# Patient Record
Sex: MALE | Race: BLACK OR AFRICAN AMERICAN | Employment: OTHER | URBAN - METROPOLITAN AREA
[De-identification: names, ages, dates, MRNs, and addresses within clinical notes are randomized per-mention and may not be internally consistent; named-entity substitution may affect disease eponyms.]

---

## 2017-05-04 ENCOUNTER — OP HISTORICAL/CONVERTED ENCOUNTER (OUTPATIENT)
Dept: OTHER | Age: 52
End: 2017-05-04

## 2021-03-19 ENCOUNTER — APPOINTMENT (OUTPATIENT)
Dept: GENERAL RADIOLOGY | Age: 56
End: 2021-03-19
Attending: EMERGENCY MEDICINE
Payer: MEDICARE

## 2021-03-19 ENCOUNTER — HOSPITAL ENCOUNTER (OUTPATIENT)
Age: 56
Setting detail: OBSERVATION
Discharge: HOME HEALTH | End: 2021-03-21
Attending: EMERGENCY MEDICINE | Admitting: INTERNAL MEDICINE
Payer: MEDICARE

## 2021-03-19 ENCOUNTER — APPOINTMENT (OUTPATIENT)
Dept: CT IMAGING | Age: 56
End: 2021-03-19
Attending: EMERGENCY MEDICINE
Payer: MEDICARE

## 2021-03-19 DIAGNOSIS — G45.9 TIA (TRANSIENT ISCHEMIC ATTACK): Primary | ICD-10-CM

## 2021-03-19 PROBLEM — I10 HYPERTENSION, MALIGNANT: Status: ACTIVE | Noted: 2021-03-19

## 2021-03-19 PROBLEM — F31.9 BIPOLAR DEPRESSION (HCC): Status: ACTIVE | Noted: 2021-03-19

## 2021-03-19 PROBLEM — I10 UNCONTROLLED HYPERTENSION: Status: ACTIVE | Noted: 2021-03-19

## 2021-03-19 PROBLEM — I10 MALIGNANT HYPERTENSION: Status: ACTIVE | Noted: 2021-03-19

## 2021-03-19 PROBLEM — F10.10 ALCOHOL ABUSE: Status: ACTIVE | Noted: 2021-03-19

## 2021-03-19 PROBLEM — Z91.199 NON-COMPLIANCE: Status: ACTIVE | Noted: 2021-03-19

## 2021-03-19 PROBLEM — F17.200 SMOKER: Status: ACTIVE | Noted: 2021-03-19

## 2021-03-19 PROBLEM — C61 PROSTATE CANCER (HCC): Chronic | Status: ACTIVE | Noted: 2021-03-19

## 2021-03-19 PROBLEM — C61 PROSTATE CANCER (HCC): Status: ACTIVE | Noted: 2021-03-19

## 2021-03-19 LAB
ALBUMIN SERPL-MCNC: 3.6 G/DL (ref 3.5–5)
ALBUMIN/GLOB SERPL: 0.9 {RATIO} (ref 1.1–2.2)
ALP SERPL-CCNC: 51 U/L (ref 45–117)
ALT SERPL-CCNC: 35 U/L (ref 12–78)
ANION GAP SERPL CALC-SCNC: 7 MMOL/L (ref 5–15)
AST SERPL W P-5'-P-CCNC: 25 U/L (ref 15–37)
BASOPHILS # BLD: 0 K/UL (ref 0–0.1)
BASOPHILS NFR BLD: 1 % (ref 0–1)
BILIRUB SERPL-MCNC: 0.3 MG/DL (ref 0.2–1)
BUN SERPL-MCNC: 16 MG/DL (ref 6–20)
BUN/CREAT SERPL: 14 (ref 12–20)
CA-I BLD-MCNC: 8.8 MG/DL (ref 8.5–10.1)
CHLORIDE SERPL-SCNC: 104 MMOL/L (ref 97–108)
CO2 SERPL-SCNC: 27 MMOL/L (ref 21–32)
CREAT SERPL-MCNC: 1.17 MG/DL (ref 0.7–1.3)
DIFFERENTIAL METHOD BLD: ABNORMAL
EOSINOPHIL # BLD: 0.2 K/UL (ref 0–0.4)
EOSINOPHIL NFR BLD: 4 % (ref 0–7)
ERYTHROCYTE [DISTWIDTH] IN BLOOD BY AUTOMATED COUNT: 14.8 % (ref 11.5–14.5)
GLOBULIN SER CALC-MCNC: 4.1 G/DL (ref 2–4)
GLUCOSE BLD STRIP.AUTO-MCNC: 108 MG/DL (ref 65–100)
GLUCOSE SERPL-MCNC: 112 MG/DL (ref 65–100)
HCT VFR BLD AUTO: 43.2 % (ref 36.6–50.3)
HGB BLD-MCNC: 14.5 G/DL (ref 12.1–17)
IMM GRANULOCYTES # BLD AUTO: 0 K/UL (ref 0–0.04)
IMM GRANULOCYTES NFR BLD AUTO: 0 % (ref 0–0.5)
LYMPHOCYTES # BLD: 1.8 K/UL (ref 0.8–3.5)
LYMPHOCYTES NFR BLD: 32 % (ref 12–49)
MCH RBC QN AUTO: 30.7 PG (ref 26–34)
MCHC RBC AUTO-ENTMCNC: 33.6 G/DL (ref 30–36.5)
MCV RBC AUTO: 91.5 FL (ref 80–99)
MONOCYTES # BLD: 0.6 K/UL (ref 0–1)
MONOCYTES NFR BLD: 11 % (ref 5–13)
NEUTS SEG # BLD: 3 K/UL (ref 1.8–8)
NEUTS SEG NFR BLD: 52 % (ref 32–75)
PERFORMED BY, TECHID: ABNORMAL
PLATELET # BLD AUTO: 214 K/UL (ref 150–400)
PMV BLD AUTO: 11 FL (ref 8.9–12.9)
POTASSIUM SERPL-SCNC: 4.1 MMOL/L (ref 3.5–5.1)
PROT SERPL-MCNC: 7.7 G/DL (ref 6.4–8.2)
RBC # BLD AUTO: 4.72 M/UL (ref 4.1–5.7)
SODIUM SERPL-SCNC: 138 MMOL/L (ref 136–145)
TROPONIN I SERPL-MCNC: <0.05 NG/ML
WBC # BLD AUTO: 5.7 K/UL (ref 4.1–11.1)

## 2021-03-19 PROCEDURE — 71045 X-RAY EXAM CHEST 1 VIEW: CPT

## 2021-03-19 PROCEDURE — 84484 ASSAY OF TROPONIN QUANT: CPT

## 2021-03-19 PROCEDURE — 99218 HC RM OBSERVATION: CPT

## 2021-03-19 PROCEDURE — 74011250637 HC RX REV CODE- 250/637: Performed by: EMERGENCY MEDICINE

## 2021-03-19 PROCEDURE — 74011250637 HC RX REV CODE- 250/637: Performed by: INTERNAL MEDICINE

## 2021-03-19 PROCEDURE — 93005 ELECTROCARDIOGRAM TRACING: CPT

## 2021-03-19 PROCEDURE — 74011250636 HC RX REV CODE- 250/636: Performed by: EMERGENCY MEDICINE

## 2021-03-19 PROCEDURE — 96374 THER/PROPH/DIAG INJ IV PUSH: CPT

## 2021-03-19 PROCEDURE — 70498 CT ANGIOGRAPHY NECK: CPT

## 2021-03-19 PROCEDURE — 70450 CT HEAD/BRAIN W/O DYE: CPT

## 2021-03-19 PROCEDURE — 80053 COMPREHEN METABOLIC PANEL: CPT

## 2021-03-19 PROCEDURE — 85025 COMPLETE CBC W/AUTO DIFF WBC: CPT

## 2021-03-19 PROCEDURE — 99285 EMERGENCY DEPT VISIT HI MDM: CPT

## 2021-03-19 PROCEDURE — 74011000636 HC RX REV CODE- 636: Performed by: EMERGENCY MEDICINE

## 2021-03-19 PROCEDURE — 82962 GLUCOSE BLOOD TEST: CPT

## 2021-03-19 PROCEDURE — 36415 COLL VENOUS BLD VENIPUNCTURE: CPT

## 2021-03-19 RX ORDER — LABETALOL HYDROCHLORIDE 5 MG/ML
5 INJECTION, SOLUTION INTRAVENOUS
Status: DISCONTINUED | OUTPATIENT
Start: 2021-03-19 | End: 2021-03-19

## 2021-03-19 RX ORDER — SODIUM CHLORIDE 0.9 % (FLUSH) 0.9 %
5-40 SYRINGE (ML) INJECTION AS NEEDED
Status: DISCONTINUED | OUTPATIENT
Start: 2021-03-19 | End: 2021-03-21 | Stop reason: HOSPADM

## 2021-03-19 RX ORDER — GUAIFENESIN 100 MG/5ML
81 LIQUID (ML) ORAL DAILY
Status: DISCONTINUED | OUTPATIENT
Start: 2021-03-20 | End: 2021-03-21 | Stop reason: HOSPADM

## 2021-03-19 RX ORDER — ASPIRIN 325 MG
325 TABLET ORAL
Status: COMPLETED | OUTPATIENT
Start: 2021-03-19 | End: 2021-03-19

## 2021-03-19 RX ORDER — FACIAL-BODY WIPES
10 EACH TOPICAL DAILY PRN
Status: DISCONTINUED | OUTPATIENT
Start: 2021-03-19 | End: 2021-03-21 | Stop reason: HOSPADM

## 2021-03-19 RX ORDER — LABETALOL HCL 20 MG/4 ML
10 SYRINGE (ML) INTRAVENOUS
Status: DISCONTINUED | OUTPATIENT
Start: 2021-03-19 | End: 2021-03-21 | Stop reason: HOSPADM

## 2021-03-19 RX ORDER — AMLODIPINE BESYLATE 5 MG/1
5 TABLET ORAL DAILY
Status: DISCONTINUED | OUTPATIENT
Start: 2021-03-19 | End: 2021-03-21

## 2021-03-19 RX ORDER — CHLORDIAZEPOXIDE HYDROCHLORIDE 25 MG/1
25 CAPSULE, GELATIN COATED ORAL 3 TIMES DAILY
Status: DISCONTINUED | OUTPATIENT
Start: 2021-03-19 | End: 2021-03-21

## 2021-03-19 RX ORDER — ACETAMINOPHEN 650 MG/1
650 SUPPOSITORY RECTAL
Status: DISCONTINUED | OUTPATIENT
Start: 2021-03-19 | End: 2021-03-21 | Stop reason: HOSPADM

## 2021-03-19 RX ORDER — POLYETHYLENE GLYCOL 3350 17 G/17G
17 POWDER, FOR SOLUTION ORAL DAILY
Status: DISCONTINUED | OUTPATIENT
Start: 2021-03-20 | End: 2021-03-21 | Stop reason: HOSPADM

## 2021-03-19 RX ORDER — IBUPROFEN 200 MG
1 TABLET ORAL DAILY
Status: DISCONTINUED | OUTPATIENT
Start: 2021-03-20 | End: 2021-03-21 | Stop reason: HOSPADM

## 2021-03-19 RX ORDER — ATORVASTATIN CALCIUM 40 MG/1
40 TABLET, FILM COATED ORAL
Status: DISCONTINUED | OUTPATIENT
Start: 2021-03-19 | End: 2021-03-21 | Stop reason: HOSPADM

## 2021-03-19 RX ORDER — HYDRALAZINE HYDROCHLORIDE 20 MG/ML
10 INJECTION INTRAMUSCULAR; INTRAVENOUS ONCE
Status: COMPLETED | OUTPATIENT
Start: 2021-03-19 | End: 2021-03-19

## 2021-03-19 RX ORDER — METOPROLOL SUCCINATE 100 MG/1
TABLET, EXTENDED RELEASE ORAL DAILY
COMMUNITY
End: 2021-03-21

## 2021-03-19 RX ORDER — SODIUM CHLORIDE 0.9 % (FLUSH) 0.9 %
5-40 SYRINGE (ML) INJECTION EVERY 8 HOURS
Status: DISCONTINUED | OUTPATIENT
Start: 2021-03-19 | End: 2021-03-21 | Stop reason: HOSPADM

## 2021-03-19 RX ORDER — LORAZEPAM 2 MG/ML
1 INJECTION INTRAMUSCULAR
Status: DISCONTINUED | OUTPATIENT
Start: 2021-03-19 | End: 2021-03-21 | Stop reason: HOSPADM

## 2021-03-19 RX ORDER — ACETAMINOPHEN 325 MG/1
650 TABLET ORAL
Status: DISCONTINUED | OUTPATIENT
Start: 2021-03-19 | End: 2021-03-21 | Stop reason: HOSPADM

## 2021-03-19 RX ORDER — METOPROLOL TARTRATE 25 MG/1
25 TABLET, FILM COATED ORAL EVERY 12 HOURS
Status: DISCONTINUED | OUTPATIENT
Start: 2021-03-19 | End: 2021-03-21 | Stop reason: HOSPADM

## 2021-03-19 RX ORDER — DOCUSATE SODIUM 100 MG/1
100 CAPSULE, LIQUID FILLED ORAL 2 TIMES DAILY
Status: DISCONTINUED | OUTPATIENT
Start: 2021-03-19 | End: 2021-03-21 | Stop reason: HOSPADM

## 2021-03-19 RX ADMIN — DOCUSATE SODIUM 100 MG: 100 CAPSULE, LIQUID FILLED ORAL at 20:45

## 2021-03-19 RX ADMIN — AMLODIPINE BESYLATE 5 MG: 5 TABLET ORAL at 20:46

## 2021-03-19 RX ADMIN — IOPAMIDOL 100 ML: 755 INJECTION, SOLUTION INTRAVENOUS at 08:17

## 2021-03-19 RX ADMIN — METOPROLOL TARTRATE 25 MG: 25 TABLET, FILM COATED ORAL at 20:46

## 2021-03-19 RX ADMIN — Medication 10 ML: at 21:00

## 2021-03-19 RX ADMIN — ATORVASTATIN CALCIUM 40 MG: 40 TABLET, FILM COATED ORAL at 20:46

## 2021-03-19 RX ADMIN — CHLORDIAZEPOXIDE HYDROCHLORIDE 25 MG: 25 CAPSULE ORAL at 20:46

## 2021-03-19 RX ADMIN — HYDRALAZINE HYDROCHLORIDE 10 MG: 20 INJECTION INTRAMUSCULAR; INTRAVENOUS at 07:45

## 2021-03-19 RX ADMIN — ASPIRIN 325 MG ORAL TABLET 325 MG: 325 PILL ORAL at 07:51

## 2021-03-19 NOTE — ED TRIAGE NOTES
On Monday 1700, left arm and leg went numb and unable to move, fell in floor, 15-20 minutes able to start moving, and limbs tingled, denied headache, chest pain on Monday, slight right facial droop, and left leg weakness

## 2021-03-19 NOTE — PROGRESS NOTES
Primary Nurse Akash Baker RN and Afua Brunson LPN performed a dual skin assessment on this patient No impairment noted  Zack score is 21

## 2021-03-19 NOTE — H&P
History & Physical    Primary Care Provider: None  Source of Information: Patient, spouse, records and ED physician    History of Presenting Illness:   Denis Krabbe is a 63 y.o. male who presents with history of hypertension, noncompliant with medications at least over the last 2 years, daily alcohol abuse, smoker, history of prostate cancer status post robotic surgery in remission he states and follows up with urology but does not follow-up with primary care in fact has not seen primary care in 2 years. He presented to the freestanding emergency department today with complaints of left-sided weakness. Wife is at bedside assisting with history. She states that on Monday which was 4 days ago patient suddenly seemed to become weak on his left side stating that he had difficulty walking and he had a flaccid left upper extremity. No seizure activity. She tried to get and come to the emergency room that night but he would not and she kept trying over the next few days and finally convinced him to come to the hospital today. His symptoms have dissipated for the most part except he still has some left lower extremity weakness which he states is chronic. On presentation to the emergency room his blood pressure was markedly elevated over 042 systolic, it improved some with some IV hydralazine. Contrast CT of the head reported ill-defined approximate 1 cm oblong hypodensity within the right external capsule, favoring looking. Probable subacute to chronic finding otherwise there is preserved gray-white differentiation and no intracranial hemorrhage. Head and neck showed no evidence for aneurysms, focal stenosis or branch occlusions. He was transferred here to [address] [place] [address] for further evaluation and management. Review of Systems:  Review of Systems   Constitutional: Negative for chills, fever and malaise/fatigue. HENT: Negative. Eyes: Negative. Respiratory: Negative. Cardiovascular: Negative. Gastrointestinal: Negative. Genitourinary:        History of robotic prostate surgery secondary to prostate cancer more than 2 years ago when he states that occasionally has incontinence. Musculoskeletal: Negative. Skin: Negative. Neurological: Positive for focal weakness and weakness. Negative for dizziness, tingling, tremors, sensory change, speech change, seizures, loss of consciousness and headaches. Endo/Heme/Allergies: Negative. Psychiatric/Behavioral: Negative. Past Medical History:   Diagnosis Date    Alcohol abuse 3/19/2021    [facility] ([facility] 3/19/2021    Cancer ([facility]     Prostate Cancer    Hypertension     Non-compliance 3/19/2021    Prostate cancer ([facility] 3/19/2021    Smoker 3/19/2021    Stroke [facility])       Past Surgical History:   Procedure Laterality Date    HX PROSTATE SURGERY      3 of robotic prostate surgery secondary to cancer     Prior to Admission medications    Medication Sig Start Date End Date Taking? Authorizing Provider   metoprolol succinate (TOPROL-XL) 100 mg tablet Take  by mouth daily. Other, MD Curt     No Known Allergies   Family History   Problem Relation Age of Onset    Hypertension Mother         SOCIAL HISTORY:  Patient resides: Lives with wife independently  Independently x   Assisted Living    SNF    With family care       Smoking history:   None    Former    Chronic x     Alcohol history:   None    Social    Chronic x     Ambulates:   Independently x   w/cane    w/walker    w/wc    CODE STATUS:  DNR    Full x   Other      Objective:     Physical Exam:     Visit Vitals  BP (!) 158/83 (BP 1 Location: Right upper arm, BP Patient Position: At rest)   Pulse 70   Temp 98.1 °F (36.7 °C)   Resp 20   Ht 5' 4\" (1.626 m)   Wt 74.8 kg (165 lb)   SpO2 100%   BMI 28.32 kg/m²      O2 Device: Room air    General:  Alert, cooperative, no distress, appears stated age.    Head:  Normocephalic, without obvious abnormality, atraumatic. No facial asymmetry   Eyes:  Conjunctivae/corneas clear. PERRL, EOMs intact. Throat: Lips, mucosa, and tongue normal. Teeth and gums normal.   Neck: Supple, symmetrical, trachea midline, no adenopathy, thyroid: no enlargement/tenderness/nodules, no carotid bruit and no JVD. Back:   Symmetric, no curvature. ROM normal. No CVA tenderness. Lungs:   Clear to auscultation bilaterally. Chest wall:  No tenderness or deformity. Heart:  Regular rate and rhythm, S1, S2 normal, no murmur, click, rub or gallop. Abdomen:   Soft, non-tender. Bowel sounds normal. No masses,  No organomegaly. Extremities: Extremities normal, atraumatic, no cyanosis or edema. Pulses: 2+ and symmetric all extremities. Skin: Skin color, texture, turgor normal. No rashes or lesions   Neurologic: CNII-XII intact. No focal sensory deficits. Motor exam upper extremities 5 out of 5, lower extremities there is very subtle weakness on the left lower extremity which incidentally characterizes is chronic and back to his baseline. Left lower extremity 5/5. Speech is fluent and cognition is intact. Data Review:     Recent Days:  Recent Labs     03/19/21  0727   WBC 5.7   HGB 14.5   HCT 43.2        Recent Labs     03/19/21  0727      K 4.1      CO2 27   *   BUN 16   CREA 1.17   CA 8.8   ALB 3.6   TBILI 0.3   ALT 35     No results for input(s): PH, PCO2, PO2, HCO3, FIO2 in the last 72 hours.     24 Hour Results:  Recent Results (from the past 24 hour(s))   GLUCOSE, POC    Collection Time: 03/19/21  7:19 AM   Result Value Ref Range    Glucose (POC) 108 (H) 65 - 100 mg/dL    Performed by Maury Wilson    CBC WITH AUTOMATED DIFF    Collection Time: 03/19/21  7:27 AM   Result Value Ref Range    WBC 5.7 4.1 - 11.1 K/uL    RBC 4.72 4.10 - 5.70 M/uL    HGB 14.5 12.1 - 17.0 g/dL    HCT 43.2 36.6 - 50.3 %    MCV 91.5 80.0 - 99.0 FL    MCH 30.7 26.0 - 34.0 PG    MCHC 33.6 30.0 - 36.5 g/dL    RDW 14.8 (H) 11.5 - 14.5 %    PLATELET 387 271 - 780 K/uL    MPV 11.0 8.9 - 12.9 FL    NEUTROPHILS 52 32 - 75 %    LYMPHOCYTES 32 12 - 49 %    MONOCYTES 11 5 - 13 %    EOSINOPHILS 4 0 - 7 %    BASOPHILS 1 0 - 1 %    IMMATURE GRANULOCYTES 0 0.0 - 0.5 %    ABS. NEUTROPHILS 3.0 1.8 - 8.0 K/UL    ABS. LYMPHOCYTES 1.8 0.8 - 3.5 K/UL    ABS. MONOCYTES 0.6 0.0 - 1.0 K/UL    ABS. EOSINOPHILS 0.2 0.0 - 0.4 K/UL    ABS. BASOPHILS 0.0 0.0 - 0.1 K/UL    ABS. IMM. GRANS. 0.0 0.00 - 0.04 K/UL    DF AUTOMATED     METABOLIC PANEL, COMPREHENSIVE    Collection Time: 03/19/21  7:27 AM   Result Value Ref Range    Sodium 138 136 - 145 mmol/L    Potassium 4.1 3.5 - 5.1 mmol/L    Chloride 104 97 - 108 mmol/L    CO2 27 21 - 32 mmol/L    Anion gap 7 5 - 15 mmol/L    Glucose 112 (H) 65 - 100 mg/dL    BUN 16 6 - 20 mg/dL    Creatinine 1.17 0.70 - 1.30 mg/dL    BUN/Creatinine ratio 14 12 - 20      GFR est AA >60 >60 ml/min/1.73m2    GFR est non-AA >60 >60 ml/min/1.73m2    Calcium 8.8 8.5 - 10.1 mg/dL    Bilirubin, total 0.3 0.2 - 1.0 mg/dL    AST (SGOT) 25 15 - 37 U/L    ALT (SGPT) 35 12 - 78 U/L    Alk. phosphatase 51 45 - 117 U/L    Protein, total 7.7 6.4 - 8.2 g/dL    Albumin 3.6 3.5 - 5.0 g/dL    Globulin 4.1 (H) 2.0 - 4.0 g/dL    A-G Ratio 0.9 (L) 1.1 - 2.2     TROPONIN I    Collection Time: 03/19/21  7:27 AM   Result Value Ref Range    Troponin-I, Qt. <0.05 <0.05 ng/mL         Imaging:     Assessment:     Active Problems:    TIA (transient ischemic attack) (3/19/2021)      Essential hypertension (3/19/2021)      Non-compliance (3/19/2021)      Malignant hypertension (3/19/2021)      Smoker (3/19/2021)      Prostate cancer ([facility] (3/19/2021)      Alcohol abuse (3/19/2021)      Bipolar depression ([facility] (3/19/2021)       68-year-old gentleman with a history of hypertension but has not followed up with his primary care physician in more than 2 years and takes his medication sporadically.   He knows he takes metoprolol not aware of any other medications. He also takes takes medications for bipolar disorder which he states he is compliant with but does not know the medications of these either. It appears that he had a TIA, symptoms on Monday per video shown to me by his wife showed pretty significant left-sided weakness, gait instability and the symptoms have resolved now and it appears. He has some subtle weakness of his left lower extremity which is characterizes is chronic. Malignant hypertension on presentation, 186/98 on bedside evaluation now      -tia, lue weakness/left facial droop, lle and gait instability initially described with onset 3/15/21- appears back to his baseline.  -Malignant hypertension,  -Noncompliant  -alcohol abuse  -smoker  -bipolar depression    Plan:     Observation admission, working up TIA and hypertension. We will get a TTE, carotid ultrasound, MRI of the brain  Place neurology for further recommendations  Aspirin, statin  We will start him on metoprolol and amlodipine for now, titrate and monitor. Smoking cessation encouraged, Kezia offered  CIWA protocol, will start Librium and taper, Ativan as needed, cessation strongly encouraged. Patient does not know any of his home medications other than metoprolol which he takes only sporadically. He does take medications for bipolar depression and those are at home and his wife will call these in and will resume these in the morning hopefully. CODE STATUS: Full  Surrogate: Wife at bedside  Disposition: Observation admission, anticipating home soon with outpatient follow-up. He will need PCP referral on discharge. Will get PT/OT evaluations as well.     Signed By: Marielos Trinh MD     March 19, 2021

## 2021-03-19 NOTE — ED PROVIDER NOTES
EMERGENCY DEPARTMENT HISTORY AND PHYSICAL EXAM      Date: 3/19/2021  Patient Name: Becky Loza    History of Presenting Illness     Chief Complaint   Patient presents with    Extremity Weakness    Chest Pain       History Provided By: Patient    HPI: Becky Loza, 63 y.o. male with a past medical history significant hypertension, stroke and Prostate cancer presents to the ED with cc of left upper extremity weakness of insidious onset, 5 days ago, which is new. His wife had been trying to get him to come to the emergency room for the last 4 days for evaluation, however he had refused. He also had experienced associated transient facial weakness which is since resolved. No other constitutional symptoms at this time. Changes in mental status or headache, blurred vision or double vision. No history suggestive of amaurosis fugax. There are no other complaints, changes, or physical findings at this time. PCP: None    No current facility-administered medications on file prior to encounter. Current Outpatient Medications on File Prior to Encounter   Medication Sig Dispense Refill    metoprolol succinate (TOPROL-XL) 100 mg tablet Take  by mouth daily.          Past History     Past Medical History:  Past Medical History:   Diagnosis Date    Alcohol abuse 3/19/2021    [facility] ([facility] 3/19/2021    Cancer ([facility]     Prostate Cancer    Hypertension     Non-compliance 3/19/2021    Prostate cancer ([facility] 3/19/2021    Smoker 3/19/2021    [facility]        Past Surgical History:  Past Surgical History:   Procedure Laterality Date    HX PROSTATE SURGERY      3 of robotic prostate surgery secondary to cancer       Family History:  Family History   Problem Relation Age of Onset    Hypertension Mother        Social History:  Social History     Tobacco Use    Smoking status: Current Every Day Smoker     Packs/day: 1.00   Substance Use Topics    Alcohol use: Yes     Frequency: Never     Comment: Daily at least 48 ounces of beer    Drug use: Not Currently       Allergies:  No Known Allergies      Review of Systems     Review of Systems   Constitutional: Negative. Negative for chills, fatigue and fever. HENT: Negative. Negative for congestion, ear discharge, sinus pressure and sore throat. Eyes: Negative. Negative for photophobia. Respiratory: Negative. Negative for cough and shortness of breath. Cardiovascular: Negative. Negative for chest pain and palpitations. Gastrointestinal: Negative. Negative for diarrhea, nausea and vomiting. Endocrine: Negative. Genitourinary: Negative. Negative for dysuria and flank pain. Musculoskeletal: Negative. Skin: Negative. Allergic/Immunologic: Negative. Neurological: Positive for weakness. Negative for dizziness, seizures, syncope and headaches. As in HPI   Hematological: Negative. Psychiatric/Behavioral: Negative. All other systems reviewed and are negative. Physical Exam     Physical Exam  Vitals signs and nursing note reviewed. Constitutional:       General: He is not in acute distress. Appearance: He is well-developed. He is not toxic-appearing or diaphoretic. HENT:      Head: Normocephalic and atraumatic. Nose: Nose normal.      Mouth/Throat:      Mouth: Mucous membranes are moist.      Pharynx: Oropharynx is clear. Eyes:      General: No visual field deficit. Extraocular Movements: Extraocular movements intact. Pupils: Pupils are equal, round, and reactive to light. Neck:      Musculoskeletal: Normal range of motion and neck supple. Cardiovascular:      Rate and Rhythm: Normal rate and regular rhythm. Heart sounds: Normal heart sounds. Pulmonary:      Effort: Pulmonary effort is normal. No respiratory distress. Breath sounds: Normal breath sounds. Chest:      Chest wall: No mass or tenderness. Abdominal:      General: Bowel sounds are normal. There is no abdominal bruit. Palpations: Abdomen is soft. There is no hepatomegaly. Tenderness: There is no abdominal tenderness. There is no rebound. Musculoskeletal: Normal range of motion. Right lower leg: He exhibits no tenderness. No edema. Left lower leg: He exhibits no tenderness. No edema. Skin:     General: Skin is warm and dry. Capillary Refill: Capillary refill takes less than 2 seconds. Neurological:      General: No focal deficit present. Mental Status: He is alert and oriented to person, place, and time. Mental status is at baseline. GCS: GCS eye subscore is 4. GCS verbal subscore is 5. GCS motor subscore is 6. Cranial Nerves: No dysarthria. Sensory: No sensory deficit. Motor: No weakness or pronator drift. Gait: Gait is intact. Psychiatric:         Mood and Affect: Mood normal.         Behavior: Behavior normal.         Lab and Diagnostic Study Results     Labs -     Recent Results (from the past 24 hour(s))   GLUCOSE, POC    Collection Time: 03/19/21  7:19 AM   Result Value Ref Range    Glucose (POC) 108 (H) 65 - 100 mg/dL    Performed by Fili Ni    CBC WITH AUTOMATED DIFF    Collection Time: 03/19/21  7:27 AM   Result Value Ref Range    WBC 5.7 4.1 - 11.1 K/uL    RBC 4.72 4.10 - 5.70 M/uL    HGB 14.5 12.1 - 17.0 g/dL    HCT 43.2 36.6 - 50.3 %    MCV 91.5 80.0 - 99.0 FL    MCH 30.7 26.0 - 34.0 PG    MCHC 33.6 30.0 - 36.5 g/dL    RDW 14.8 (H) 11.5 - 14.5 %    PLATELET 905 656 - 253 K/uL    MPV 11.0 8.9 - 12.9 FL    NEUTROPHILS 52 32 - 75 %    LYMPHOCYTES 32 12 - 49 %    MONOCYTES 11 5 - 13 %    EOSINOPHILS 4 0 - 7 %    BASOPHILS 1 0 - 1 %    IMMATURE GRANULOCYTES 0 0.0 - 0.5 %    ABS. NEUTROPHILS 3.0 1.8 - 8.0 K/UL    ABS. LYMPHOCYTES 1.8 0.8 - 3.5 K/UL    ABS. MONOCYTES 0.6 0.0 - 1.0 K/UL    ABS. EOSINOPHILS 0.2 0.0 - 0.4 K/UL    ABS. BASOPHILS 0.0 0.0 - 0.1 K/UL    ABS. IMM.  GRANS. 0.0 0.00 - 0.04 K/UL    DF AUTOMATED     METABOLIC PANEL, COMPREHENSIVE Collection Time: 03/19/21  7:27 AM   Result Value Ref Range    Sodium 138 136 - 145 mmol/L    Potassium 4.1 3.5 - 5.1 mmol/L    Chloride 104 97 - 108 mmol/L    CO2 27 21 - 32 mmol/L    Anion gap 7 5 - 15 mmol/L    Glucose 112 (H) 65 - 100 mg/dL    BUN 16 6 - 20 mg/dL    Creatinine 1.17 0.70 - 1.30 mg/dL    BUN/Creatinine ratio 14 12 - 20      GFR est AA >60 >60 ml/min/1.73m2    GFR est non-AA >60 >60 ml/min/1.73m2    Calcium 8.8 8.5 - 10.1 mg/dL    Bilirubin, total 0.3 0.2 - 1.0 mg/dL    AST (SGOT) 25 15 - 37 U/L    ALT (SGPT) 35 12 - 78 U/L    Alk. phosphatase 51 45 - 117 U/L    Protein, total 7.7 6.4 - 8.2 g/dL    Albumin 3.6 3.5 - 5.0 g/dL    Globulin 4.1 (H) 2.0 - 4.0 g/dL    A-G Ratio 0.9 (L) 1.1 - 2.2     TROPONIN I    Collection Time: 03/19/21  7:27 AM   Result Value Ref Range    Troponin-I, Qt. <0.05 <0.05 ng/mL     No results found for this or any previous visit (from the past 12 hour(s)). Radiologic Studies -     CT Results  (Last 48 hours)               03/19/21 0822  CTA HEAD NECK W CONT Final result    Impression:  1. Normal head CT examination. No evidence for aneurysms, focal stenoses or   branch occlusions. Neck CTA           Technique: 5 mm noncontrasted images were obtained through the entire neck. Then   0.625 mm axial images with nonionic intravenous contrast were obtained. 2-D and   3-D reformats of head and neck CT angiographic data performed. Comparisons: None        Findings: A standard arch is present. No significant stenosis of the origin of   the great vessels evident. Right carotid artery: No stenoses of the right common carotid artery evident. Minimal atherosclerotic plaque is present at the bifurcation. There is no   stenosis of the origin of the right internal carotid artery by NASCET criteria. Left carotid artery: No stenoses of the left common carotid artery evident. Minimal atherosclerotic plaque is present at the bifurcation.  There is no   stenosis of the origin of the left internal carotid artery by NASCET criteria. The vertebral arteries are codominant without focal stenosis. IMPRESSION:    1. No stenosis of the origin of the right internal carotid artery by NASCET   criteria. 2. No stenosis of the origin of the left internal carotid artery by NASCET   criteria. Narrative: All CT scans at this facility are performed using dose reduction optimization   techniques as appropriate to the performed exam, including the following:   Automated exposure control, adjustment of the MA and/or KVP according to the   patient size, and the use of iterative reconstruction technique. Head CTA           Technique: 3 mm noncontrasted axial images were obtained through the entire   calvarium. Then 1.5 mm axial images with nonionic intravenous contrast obtained. 2-D MIP projections and 3-D images of the intracranial circulation performed. 100 cc Isovue-370 administered. Comparison:  Head CT examination 3/19/2021       Findings: A lacunar infarct is again observed in the right basal ganglia. No   hemorrhage or mass effect. No aneurysms, focal stenoses or branch occlusions are   present. The anterior communicating artery is present. The right posterior   communicating artery is present. The left posterior communicating artery is   present. There is no evidence for vertebral basilar insufficiency. 03/19/21 0726  CT HEAD WO CONT Final result    Impression:  Right external capsule lacune as above. No intracranial hemorrhage. Report called to ordering practitioner 7:45 AM 3/19/2021. Narrative:  CT head. Axial images are reviewed along with reformatted sagittal/coronal images.     Dose reduction: All CT scans at this facility are performed using dose reduction   optimization techniques as appropriate to a performed exam including the   following-   automated exposure control, adjustments of mA and/or Kv according to patient   size, or use of iterative reconstructive technique. .       Partial opacification ethmoid air cells. Otherwise, sinuses and mastoid air   cells are normally aerated. Review of intracranial content reveals ill-defined approximate 1 cm oblong   hypodensity within the right external capsule, favor lacune, probable subacute   to chronic finding. Otherwise, there is preserved gray-white differentiation. No   intracranial hemorrhage. CXR Results  (Last 48 hours)               03/19/21 0737  XR CHEST PORT Final result    Narrative:  Chest single view. A single view of the chest is obtained. Lung volumes are within normal limits. The peripheral lungs are clear. Cardiac and mediastinal structures are within   normal limits. There is no pneumothorax or pleural effusion. EKG done shows normal sinus rhythm, no acute ST-T changes. No STEMI    Medical Decision Making     - I am the first provider for this patient. - I reviewed the vital signs, available nursing notes, past medical history, past surgical history, family history and social history. - Initial assessment performed. The patients presenting problems have been discussed, and they are in agreement with the care plan formulated and outlined with them. I have encouraged them to ask questions as they arise throughout their visit. Vital Signs-Reviewed the patient's vital signs. Patient Vitals for the past 12 hrs:   Temp Pulse Resp BP SpO2   03/19/21 2000 98.5 °F (36.9 °C) 69 18 (!) 174/76 99 %   03/19/21 1600 -- 70 -- -- --   03/19/21 1436 98.1 °F (36.7 °C) 60 20 (!) 158/83 100 %   03/19/21 1213 98.5 °F (36.9 °C) 65 20 (!) 173/84 99 %   03/19/21 1101 99.2 °F (37.3 °C) 64 18 (!) 182/86 98 %       Records Reviewed: Nursing Notes      ED Course/Provider Notes (Medical Decision Making):    Uneventful ED course, clinical improvement with therapy, patient will be discharged to followup with PCP as directed    Disposition     Disposition: Condition stable  Admitted to Dr. Villalobos Flight service for further evaluation and treatment, and neurology consult. Admitted  DISCHARGE PLAN:  1. Current Discharge Medication List      CONTINUE these medications which have NOT CHANGED    Details   metoprolol succinate (TOPROL-XL) 100 mg tablet Take  by mouth daily. 2.   Follow-up Information    None       3. Return to ED if worse   4. Current Discharge Medication List            Diagnosis     Clinical Impression:   1. TIA (transient ischemic attack)        Attestations:    Vasiliy Morales MD    Please note that this dictation was completed with [employer], the [employer] voice recognition software. Quite often unanticipated grammatical, syntax, homophones, and other interpretive errors are inadvertently transcribed by the computer software. Please disregard these errors. Please excuse any errors that have escaped final proofreading. Thank you.

## 2021-03-20 ENCOUNTER — APPOINTMENT (OUTPATIENT)
Dept: MRI IMAGING | Age: 56
End: 2021-03-20
Attending: INTERNAL MEDICINE
Payer: MEDICARE

## 2021-03-20 ENCOUNTER — APPOINTMENT (OUTPATIENT)
Dept: NON INVASIVE DIAGNOSTICS | Age: 56
End: 2021-03-20
Attending: INTERNAL MEDICINE
Payer: MEDICARE

## 2021-03-20 LAB
ANION GAP SERPL CALC-SCNC: 4 MMOL/L (ref 5–15)
ATRIAL RATE: 56 BPM
BUN SERPL-MCNC: 14 MG/DL (ref 6–20)
BUN/CREAT SERPL: 14 (ref 12–20)
CA-I BLD-MCNC: 8.9 MG/DL (ref 8.5–10.1)
CALCULATED P AXIS, ECG09: 60 DEGREES
CALCULATED R AXIS, ECG10: -10 DEGREES
CALCULATED T AXIS, ECG11: -33 DEGREES
CHLORIDE SERPL-SCNC: 105 MMOL/L (ref 97–108)
CHOLEST SERPL-MCNC: 190 MG/DL
CO2 SERPL-SCNC: 29 MMOL/L (ref 21–32)
CREAT SERPL-MCNC: 1.03 MG/DL (ref 0.7–1.3)
DIAGNOSIS, 93000: NORMAL
ECHO AV PEAK GRADIENT: 8 MMHG
ECHO LV E' SEPTAL VELOCITY: 6.53 CM/S
ECHO LV EDV A2C: 62.6 CM3
ECHO LV EJECTION FRACTION A2C: 35 %
ECHO LV EJECTION FRACTION BIPLANE: 72.8 % (ref 55–100)
ECHO LV ESV A2C: 17 CM3
ECHO LV INTERNAL DIMENSION DIASTOLIC: 3.97 CM (ref 4.2–5.9)
ECHO LV INTERNAL DIMENSION SYSTOLIC: 2.57 CM
ECHO LV IVSD: 1.37 CM (ref 0.6–1)
ECHO LV MASS 2D: 191 G (ref 88–224)
ECHO LV MASS INDEX 2D: 106 G/M2 (ref 49–115)
ECHO LV POSTERIOR WALL DIASTOLIC: 1.29 CM (ref 0.6–1)
ECHO LVOT PEAK GRADIENT: 4 MMHG
ECHO MV A VELOCITY: 74.5 CM/S
ECHO MV E DECELERATION TIME (DT): 202 MS
ECHO MV E VELOCITY: 73.6 CM/S
ECHO MV E/A RATIO: 0.99
ECHO MV E/E' SEPTAL: 11.27
ECHO PV PEAK INSTANTANEOUS GRADIENT SYSTOLIC: 6 MMHG
ECHO PV REGURGITANT MAX VELOCITY: 101 CM/S
ECHO PV REGURGITANT MAX VELOCITY: 125 CM/S
ECHO PV REGURGITANT MAX VELOCITY: 143 CM/S
ECHO PVEIN A DURATION: 95 MS
ECHO PVEIN A VELOCITY: 29.6 CM/S
ECHO RA AREA 4C: 15.05 CM2
ECHO RV INTERNAL DIMENSION: 3.47 CM
ERYTHROCYTE [DISTWIDTH] IN BLOOD BY AUTOMATED COUNT: 15.2 % (ref 11.5–14.5)
EST. AVERAGE GLUCOSE BLD GHB EST-MCNC: 114 MG/DL
GLUCOSE SERPL-MCNC: 105 MG/DL (ref 65–100)
HBA1C MFR BLD: 5.6 % (ref 4–5.6)
HCT VFR BLD AUTO: 44.8 % (ref 36.6–50.3)
HDLC SERPL-MCNC: 55 MG/DL
HDLC SERPL: 3.5 {RATIO} (ref 0–5)
HGB BLD-MCNC: 14.7 G/DL (ref 12.1–17)
LDLC SERPL CALC-MCNC: 115.2 MG/DL (ref 0–100)
LIPID PROFILE,FLP: ABNORMAL
MCH RBC QN AUTO: 30.4 PG (ref 26–34)
MCHC RBC AUTO-ENTMCNC: 32.8 G/DL (ref 30–36.5)
MCV RBC AUTO: 92.6 FL (ref 80–99)
P-R INTERVAL, ECG05: 150 MS
PLATELET # BLD AUTO: 234 K/UL (ref 150–400)
PMV BLD AUTO: 12.4 FL (ref 8.9–12.9)
POTASSIUM SERPL-SCNC: 3.6 MMOL/L (ref 3.5–5.1)
Q-T INTERVAL, ECG07: 430 MS
QRS DURATION, ECG06: 86 MS
QTC CALCULATION (BEZET), ECG08: 414 MS
RBC # BLD AUTO: 4.84 M/UL (ref 4.1–5.7)
SODIUM SERPL-SCNC: 138 MMOL/L (ref 136–145)
TRIGL SERPL-MCNC: 99 MG/DL (ref ?–150)
TSH SERPL DL<=0.05 MIU/L-ACNC: 2.76 UIU/ML (ref 0.36–3.74)
VENTRICULAR RATE, ECG03: 56 BPM
VLDLC SERPL CALC-MCNC: 19.8 MG/DL
WBC # BLD AUTO: 6.8 K/UL (ref 4.1–11.1)

## 2021-03-20 PROCEDURE — 80061 LIPID PANEL: CPT

## 2021-03-20 PROCEDURE — 74011250636 HC RX REV CODE- 250/636: Performed by: INTERNAL MEDICINE

## 2021-03-20 PROCEDURE — 70551 MRI BRAIN STEM W/O DYE: CPT

## 2021-03-20 PROCEDURE — 97161 PT EVAL LOW COMPLEX 20 MIN: CPT

## 2021-03-20 PROCEDURE — 80048 BASIC METABOLIC PNL TOTAL CA: CPT

## 2021-03-20 PROCEDURE — 97116 GAIT TRAINING THERAPY: CPT

## 2021-03-20 PROCEDURE — 36415 COLL VENOUS BLD VENIPUNCTURE: CPT

## 2021-03-20 PROCEDURE — 99218 HC RM OBSERVATION: CPT

## 2021-03-20 PROCEDURE — 96375 TX/PRO/DX INJ NEW DRUG ADDON: CPT

## 2021-03-20 PROCEDURE — 92610 EVALUATE SWALLOWING FUNCTION: CPT

## 2021-03-20 PROCEDURE — 93005 ELECTROCARDIOGRAM TRACING: CPT

## 2021-03-20 PROCEDURE — 85027 COMPLETE CBC AUTOMATED: CPT

## 2021-03-20 PROCEDURE — 74011250637 HC RX REV CODE- 250/637: Performed by: INTERNAL MEDICINE

## 2021-03-20 PROCEDURE — 84443 ASSAY THYROID STIM HORMONE: CPT

## 2021-03-20 PROCEDURE — 83036 HEMOGLOBIN GLYCOSYLATED A1C: CPT

## 2021-03-20 PROCEDURE — 97162 PT EVAL MOD COMPLEX 30 MIN: CPT

## 2021-03-20 RX ADMIN — LORAZEPAM 1 MG: 2 INJECTION INTRAMUSCULAR; INTRAVENOUS at 22:02

## 2021-03-20 RX ADMIN — Medication 10 ML: at 14:00

## 2021-03-20 RX ADMIN — ASPIRIN 81 MG CHEWABLE TABLET 81 MG: 81 TABLET CHEWABLE at 08:41

## 2021-03-20 RX ADMIN — METOPROLOL TARTRATE 25 MG: 25 TABLET, FILM COATED ORAL at 21:20

## 2021-03-20 RX ADMIN — DOCUSATE SODIUM 100 MG: 100 CAPSULE, LIQUID FILLED ORAL at 21:21

## 2021-03-20 RX ADMIN — CHLORDIAZEPOXIDE HYDROCHLORIDE 25 MG: 25 CAPSULE ORAL at 08:42

## 2021-03-20 RX ADMIN — CHLORDIAZEPOXIDE HYDROCHLORIDE 25 MG: 25 CAPSULE ORAL at 16:46

## 2021-03-20 RX ADMIN — METOPROLOL TARTRATE 25 MG: 25 TABLET, FILM COATED ORAL at 08:42

## 2021-03-20 RX ADMIN — POLYETHYLENE GLYCOL 3350 17 G: 17 POWDER, FOR SOLUTION ORAL at 08:42

## 2021-03-20 RX ADMIN — DOCUSATE SODIUM 100 MG: 100 CAPSULE, LIQUID FILLED ORAL at 08:42

## 2021-03-20 RX ADMIN — ATORVASTATIN CALCIUM 40 MG: 40 TABLET, FILM COATED ORAL at 21:21

## 2021-03-20 RX ADMIN — Medication 10 ML: at 21:22

## 2021-03-20 RX ADMIN — CHLORDIAZEPOXIDE HYDROCHLORIDE 25 MG: 25 CAPSULE ORAL at 21:21

## 2021-03-20 NOTE — PROGRESS NOTES
SPEECH LANGUAGE PATHOLOGY BEDSIDE SWALLOW EVALUATIONS  Patient: Campbell Grier (38 y.o. male)  Date: 3/20/2021  Primary Diagnosis: TIA (transient ischemic attack) [G45.9]  Uncontrolled hypertension [I10]  Non-compliance [Z91.19]  Alcohol abuse [F10.10]        Precautions: Aspiration       ASSESSMENT :  Nsg reports patient tolerating regular diet and thin liquids without difficulty. Patient and wife deny hx dysphagia, GERD. Speech slightly slurred, left droop noted when speaking but appears symmetrical at rest. Minimal impact noted on overall speech intelligibility. Intermittent fillers and word finding deficits noted. However, patient able to provide hx, ask/respond to questions, and largely maintain reciprocal conversation. Patient frequently making jokes with wife and clinician. Based on the objective data described below, the patient's oropharyngeal swallow [facility]. Oral phase appears adequate. Swallow initiation timely. HLE appears WFL to digital palpation. No overt s/sx aspiration/penetration observed. Patient will benefit from skilled intervention to address the above impairments. Patients rehabilitation potential is considered to be Excellent. PLAN :  Recommendations and Planned Interventions:  Patient appears appropriate to continue regular diet and thin liquids, aspiration precautions advised. Small sips, small bites, slow rate of intake, maintain HOB elevation 90 degrees for at least 30 minutes after meals. Speech/lang evaluation if/as indicated. MBS if/as indicated. Patient reports possible d/c this date or 03-. Patient and wife made aware of possibility for outpatient speech/language evaluation and tx if patient is d/c'd home. Frequency/Duration: Patient will be followed by speech-language pathology 2 times a week to address goals. Discharge Recommendations: To Be Determined     SUBJECTIVE:   Patient alert, oriented x4, pleasant demeanor.  Reports wanting to be discharged home. Patient reports speech is not yet back to normal.     OBJECTIVE:     CXR Results  (Last 48 hours)                 03/19/21 0737  XR CHEST PORT Final result    Narrative:  Chest single view. A single view of the chest is obtained. Lung volumes are within normal limits. The peripheral lungs are clear. Cardiac and mediastinal structures are within   normal limits. There is no pneumothorax or pleural effusion. CT Results  (Last 48 hours)                 03/19/21 0822  CTA HEAD NECK W CONT Final result    Impression:  1. Normal head CT examination. No evidence for aneurysms, focal stenoses or   branch occlusions. Neck CTA           Technique: 5 mm noncontrasted images were obtained through the entire neck. Then   0.625 mm axial images with nonionic intravenous contrast were obtained. 2-D and   3-D reformats of head and neck CT angiographic data performed. Comparisons: None        Findings: A standard arch is present. No significant stenosis of the origin of   the great vessels evident. Right carotid artery: No stenoses of the right common carotid artery evident. Minimal atherosclerotic plaque is present at the bifurcation. There is no   stenosis of the origin of the right internal carotid artery by NASCET criteria. Left carotid artery: No stenoses of the left common carotid artery evident. Minimal atherosclerotic plaque is present at the bifurcation. There is no   stenosis of the origin of the left internal carotid artery by NASCET criteria. The vertebral arteries are codominant without focal stenosis. IMPRESSION:    1. No stenosis of the origin of the right internal carotid artery by NASCET   criteria. 2. No stenosis of the origin of the left internal carotid artery by NASCET   criteria. Narrative:   All CT scans at this facility are performed using dose reduction optimization   techniques as appropriate to the performed exam, including the following:   Automated exposure control, adjustment of the MA and/or KVP according to the   patient size, and the use of iterative reconstruction technique. Head CTA           Technique: 3 mm noncontrasted axial images were obtained through the entire   calvarium. Then 1.5 mm axial images with nonionic intravenous contrast obtained. 2-D MIP projections and 3-D images of the intracranial circulation performed. 100 cc Isovue-370 administered. Comparison:  Head CT examination 3/19/2021       Findings: A lacunar infarct is again observed in the right basal ganglia. No   hemorrhage or mass effect. No aneurysms, focal stenoses or branch occlusions are   present. The anterior communicating artery is present. The right posterior   communicating artery is present. The left posterior communicating artery is   present. There is no evidence for vertebral basilar insufficiency. 03/19/21 0726  CT HEAD WO CONT Final result    Impression:  Right external capsule lacune as above. No intracranial hemorrhage. Report called to ordering practitioner 7:45 AM 3/19/2021. Narrative:  CT head. Axial images are reviewed along with reformatted sagittal/coronal images. Dose reduction: All CT scans at this facility are performed using dose reduction   optimization techniques as appropriate to a performed exam including the   following-   automated exposure control, adjustments of mA and/or Kv according to patient   size, or use of iterative reconstructive technique. .       Partial opacification ethmoid air cells. Otherwise, sinuses and mastoid air   cells are normally aerated. Review of intracranial content reveals ill-defined approximate 1 cm oblong   hypodensity within the right external capsule, favor lacune, probable subacute   to chronic finding. Otherwise, there is preserved gray-white differentiation. No   intracranial hemorrhage. Past Medical History:   Diagnosis Date    Alcohol abuse 3/19/2021    Bipolar depression ([facility] 3/19/2021    Cancer ([facility]     Prostate Cancer    Hypertension     Non-compliance 3/19/2021    [facility] 3/19/2021    Smoker 3/19/2021    Stroke [facility])      Past Surgical History:   Procedure Laterality Date    HX PROSTATE SURGERY      3 of robotic prostate surgery secondary to cancer     Prior Level of Function/Home Situation:   Home Situation  Home Environment: Private residence  # Steps to Enter: 3  Rails to Enter: Yes  Hand Rails : Bilateral  Wheelchair Ramp: No  One/Two Story Residence: One story  Support Systems: Spouse/Significant Other/Partner  Current DME Used/Available at Home: None  Diet prior to admission: regular/thin  Current Diet:  regular/thin     Cognitive and Communication Status:  Neurologic State: Alert, Appropriate for age, Eyes open spontaneously  Orientation Level: Oriented X4  Cognition: Appropriate for age attention/concentration, Follows commands, Recognition of people/places, Appropriate decision making           Swallowing Evaluation:   Oral Assessment:  Oral Assessment  Labial: Left droop  Dentition: Other (comment)(some missing teeth)  Oral Hygiene: adequate  Lingual: No impairment  Velum: No impairment  Mandible: No impairment    P.O. Trials:  Patient Position: upright in bed  Vocal quality prior to P.O.: No impairment  Consistency Presented: Ice chips; Thin liquid;Puree; Solid  How Presented: Self-fed/presented;Cup/sip;Spoon;Straw;Successive swallows  Bolus Acceptance: No impairment  Bolus Formation/Control: No impairment  Propulsion: No impairment  Oral Residue: None  Initiation of Swallow: No impairment  Laryngeal Elevation: Functional  Aspiration Signs/Symptoms: None  Pharyngeal Phase Characteristics: No impairment, issues, or problems   Effective Modifications: Small sips and bites  Cues for Modifications: Minimal       Oral Phase Severity: No impairment  Pharyngeal Phase Severity : No impairment      Voice:   Vocal Quality: No impairment                 After treatment:   Patient left in no apparent distress in bed, Call bell within reach, Nursing notified, and Caregiver / family present    COMMUNICATION/EDUCATION:   Patient and patient's wife educated regarding purpose of SLP assessment, POC, diet recs and sw safety precautions. Patient demonstrated Excellent understanding as evidenced by engagement. The patient's plan of care including recommendations, planned interventions, and recommended diet changes were discussed with: Registered nurse. Patient/family have participated as able in goal setting and plan of care. Patient/family agree to work toward stated goals and plan of care. Thank you for this referral.  Armani Whiting M.S. CCC-SLP                Problem: Dysphagia (Adult)  Goal: *Acute Goals and Plan of Care (Insert Text)  Description: Speech Therapy Goals  Initiated 3/20/2021  -Patient will tolerate regular diet with thin liquids without signs/symptoms of aspiration given no cues within 7 day(s). [ ] Not met  [ ]  MET   [ ] Progressing  [ ] Bo Lyle  -Patient will demonstrate understanding of swallow safety precautions and aspiration precautions, diet recs with no cues within 7 day(s).         [ ] Not met  [ ]  MET   [ ] Progressing  [ ] Discontinue     Outcome: Not Met  Goal: Interventions  Outcome: Not Met

## 2021-03-20 NOTE — PROGRESS NOTES
Problem: Mobility Impaired (Adult and Pediatric)  Goal: *Acute Goals and Plan of Care (Insert Text)  Description: Physical Therapy Goals  Initiated 3/20/2021  1. Patient will move from supine to sit and sit to supine  in bed with independence within 7 day(s). 2.  Patient will ambulate with independence for 300 feet with the least restrictive device within 7 day(s). 3.  Patient will ascend/descend 3 stairs with kirill handrail(s) with supervision/set-up within 7 day(s). Outcome: Not Met     PHYSICAL THERAPY EVALUATION  Patient: Jose Rafael Waterman (27 y.o. male)  Date: 3/20/2021  Primary Diagnosis: TIA (transient ischemic attack) [G45.9]  Uncontrolled hypertension [I10]  Non-compliance [Z91.19]  Alcohol abuse [F10.10]        Precautions: fall       ASSESSMENT  Per H&P Jose Rafael Waterman is a 63 y.o. male who presents with history of hypertension, noncompliant with medications at least over the last 2 years, daily alcohol abuse, smoker, hx of CVA with L side weakness, history of prostate cancer status post robotic surgery in remission he states and follows up with urology but does not follow-up with primary care in fact has not seen primary care in 2 years. He presented to the freestanding emergency department today with complaints of left-sided weakness. Monday 3/15/21 patient suddenly seemed to become weak on his left side stating that he had difficulty walking and he had a flaccid left upper extremity. No seizure activity. She tried to get and come to the emergency room that night but he would not and she kept trying over the next few days and finally convinced him to come to the hospital today. His symptoms have dissipated for the most part except he still has some left lower extremity weakness which he states is chronic. Contrast CT with probable subacute to chronic finding at R external capsule otherwise there is preserved gray-white differentiation and no intracranial hemorrhage.  Pt [facility].    Pt received supine in bed agreeable to PT eval/treatment. A&Ox4. Pt states he lives in single level home with wife, 3 SAPNA with kirill rails. Wife works but is off at this time and would be home most of the day to assist pt as needed for the next few weeks. Pt states he works part time but job involves lifting and is not sure he is going back to work. PLOF IND for all mobility and ADLs, does not have AD available. Reports LLE weakness chronic. Continues to have some numbness in the L hand otherwise symptoms seem to be resolving. At evaluation, pt required min A to transfer supine to sit. Diminished sensation noted through L lower leg and foot to light touch. MMT reveal LLE weakness, grossly 3+/5 to 4/5, see below for details. RLE 5/5 WNL. Able to stand independently without AD, good unsupported standing balance. Gait with SBA x150 feet. Notable gait abnormalities including decreased foot clearance, decreased heel strike, L antalgic gait. Pt would benefit from [facility] for community distance ambulation as pt states his LLE gets tired and he is concerned about falls. Pt reports he is close to baseline for mobility/gait. Pt session cut short as transport arrived to take pt to MRI. Recommend HHPT and pt would benefit from [facility]. Current Level of Function Impacting Discharge (mobility/balance): SBA    Patient will benefit from skilled therapy intervention to address the above noted impairments. PLAN :  Recommendations and Planned Interventions: transfer training, gait training, therapeutic exercises, patient and family training/education, and therapeutic activities      Frequency/Duration: Patient will be followed by physical therapy:  5 times a week to address goals.     Recommendation for discharge: (in order for the patient to meet his/her long term goals)  HHPT    This discharge recommendation:  Has not yet been discussed the attending provider and/or case management    IF patient discharges home will need the following DME: straight cane         SUBJECTIVE:   Patient stated am I going to get to go home soon?     OBJECTIVE DATA SUMMARY:   HISTORY:    Past Medical History:   Diagnosis Date    Alcohol abuse 3/19/2021    Bipolar depression ([facility] 3/19/2021    Cancer ([facility]     Prostate Cancer    Hypertension     Non-compliance 3/19/2021    Prostate cancer ([facility] 3/19/2021    Smoker 3/19/2021    Stroke ([facility]      Past Surgical History:   Procedure Laterality Date    HX PROSTATE SURGERY      3 of robotic prostate surgery secondary to cancer       Personal factors and/or comorbidities impacting plan of care:     Home Situation  Home Environment: Private residence  # Steps to Enter: 3  Rails to Enter: Yes  Hand Rails : Bilateral  Wheelchair Ramp: No  One/Two Story Residence: One story  Support Systems: Spouse/Significant Other/Partner  Current DME Used/Available at Home: None    PLOF: Pt IND for ADLS/IADLS, IND with mobility prior to admission. EXAMINATION/PRESENTATION/DECISION MAKING:   Critical Behavior:  Neurologic State: Alert  Orientation Level: Oriented X4  Cognition: Appropriate decision making, Appropriate safety awareness     Hearing:   Auditory  Auditory Impairment: None  Skin:  intact  Edema: WNL  Range Of Motion:  AROM: Within functional limits                       Strength:    Strength: Generally decreased, functional          Right Left   HIP FLEXION 5/5 3+/5   HIP EXTENSION NT    HIP ABDUCTION NT NT   KNEE FLEXION 5/5 4/5   KNEE EXTENSION 5/5 4-/5   ANKLE PF  5/5 4-/5   ANKLE DF 5/5 4-/5                 Tone & Sensation:                  Sensation: Impaired diminished to LT L lower leg and foot               Coordination:     Vision:      Functional Mobility:  Bed Mobility:     Supine to Sit: Minimum assistance        Transfers:  Sit to Stand: Independent  Stand to Sit: Independent                       Balance:   Sitting: Intact  Standing: Intact  Ambulation/Gait Training:  Distance (ft): 150 Feet (ft)  Assistive Device: Gait belt  Ambulation - Level of Assistance: Stand-by assistance     Gait Description (WDL): Exceptions to WDL  Gait Abnormalities: Decreased step clearance        Base of Support: Widened                              Stairs:      Not tested at evaluation        Therapeutic Exercises:   Unable to provide HEP due to time constraint    Functional Measure:    [address] [place] [id number] AM-PAC [facility] [place]         Basic Mobility Inpatient Short Form  How much difficulty does the patient currently have. .. Unable A Lot A Little None   1. Turning over in bed (including adjusting bedclothes, sheets and blankets)? [] 1   [] 2   [] 3   [x] 4   2. Sitting down on and standing up from a chair with arms ( e.g., wheelchair, bedside commode, etc.)   [] 1   [] 2   [] 3   [x] 4   3. Moving from lying on back to sitting on the side of the bed? [] 1   [] 2   [x] 3   [] 4          How much help from another person does the patient currently need. .. Total A Lot A Little None   4. Moving to and from a bed to a chair (including a wheelchair)? [] 1   [] 2   [x] 3   [] 4   5. Need to walk in hospital room? [] 1   [] 2   [x] 3   [] 4   6. Climbing 3-5 steps with a railing? [] 1   [] 2   [x] 3   [] 4   © 2007, Trustees of [address] [place] [id number], under license to [employer]. All rights reserved     Score:  Initial: 20 Most Recent: CJ (Date: 3/20/21 )   Interpretation of Tool:  Represents activities that are increasingly more difficult (i.e. Bed mobility, Transfers, Gait).   Score 24 23 22-20 19-15 14-10 9-7 6   Modifier CH CI CJ CK CL CM CN          Physical Therapy Evaluation Charge Determination   History Examination Presentation Decision-Making   HIGH Complexity :3+ comorbidities / personal factors will impact the outcome/ POC  MEDIUM Complexity : 3 Standardized tests and measures addressing body structure, function, activity limitation and / or participation in recreation  MEDIUM Complexity : Evolving with changing characteristics  Other Functional Measure [facility] 6 MEDIUM      Based on the above components, the patient evaluation is determined to be of the following complexity level: MEDIUM    Pain Ratin/10    Activity Tolerance:   Good  Please refer to the flowsheet for vital signs taken during this treatment.    After treatment patient left in no apparent distress:   Transport for MRI    COMMUNICATION/EDUCATION:   The patient’s plan of care was discussed with: Registered nurse.     Patient/family have participated as able in goal setting and plan of care.    Thank you for this referral.  Madison Beatty, PT, DPT   Time Calculation: 29 mins

## 2021-03-20 NOTE — PROGRESS NOTES
NEUROLOGY  PROGRESS NOTE    Admission History/Pertinent Events  Sumi Smith is a 63y.o. year old male who presented on 3/19/2021. Patient has a past medical history of Stroke, Prostate Cancer, HTN, Bipolar Disorder, EtOH Abuse, Medication Noncompliance who presented with LHB weakness. Last known normal was 4 days prior to presentation. Patient reportedly has slowly been getting better.     In the ED, emergent CT head concerning for subacute ischemia in the right external capsule. CTA head/neck was without any findings of a large vessel occlusion. Patient's systolic blood pressures were elevated to 220s in the ED. ASSESSMENT/PLAN      Impression  Patient's MRI of the brain revealing right basal gangliar infarct. Patient's TTE without any acute findings. Will continue patient on antiplatelet and statin therapy for stroke prophylaxis and encourage patient to take his blood pressure medications. Of note, patient continues to have systolic blood pressures up to the 170s and will need further management of this. CTH WO  Hypoattenuation in the right putamen/external capsule region consistent with subacute ischemia     CTA Head/Neck  No occlusions, dissections, significant stenosis, pseudoaneurysms or aneurysms in the neurovascular system    MRI Brain WO  Posterior right BG infarct  CMIC    TTE  EF 65%, RA/LA normal, no PFO    TSH: 2.76  HgbA1c: 5.6  LDL: 115.2        Plan      Posterior Right BG Ischemia  -Q6Hr NeuroChecks, TELE  -SBP Goal < 160  -Stroke Prophylaxis: ASA 81, Atorvastatin 40   -PT/OT/ST as needed  -Management of metabolic/infectious derangements to referring teams    Patient follow-up with neurology in 2 weeks from discharge. Please contact neurology with any further questions/concerns. SUBJECTIVE   Patient was some elevated systolic blood pressures up to the 170s. Patient's exam remains unchanged.       Physical/Neurological Exam  General:   Young -American male  Cardiovascular: tachycardic at times  Pulmonary: no increased work of breathing  Gastrointestinal/Abdomen: soft w/hypoactive bowel sounds   Skin: warm and dry      Patient awake, alert; following central and peripheral commands   No expressive or receptive aphasia;  No dysarthria   Pupils react to light bilaterally; EOM Intact   No visual field deficits on gross exam   Intact to light touch on face bilaterally   Left lower facial droop  Motor: 5/5 in the right hemibody, 4/5 in the left hemibody  Sensation to light touch intact grossly throughout but decreased in the left hemibody compared to the right      OBJECTIVE  Vital Signs  Temp:  [97.8 °F (36.6 °C)-100 °F (37.8 °C)]   Pulse (Heart Rate):  [56-71]   BP: (155-240)/()   Resp Rate:  [16-20]   O2 Sat (%):  [98 %-100 %]   Weight:  [74.8 kg (165 lb)]     MEDICATIONS    Current Facility-Administered Medications:     acetaminophen (TYLENOL) tablet 650 mg, 650 mg, Oral, Q4H PRN **OR** acetaminophen (TYLENOL) solution 650 mg, 650 mg, Per NG tube, Q4H PRN **OR** acetaminophen (TYLENOL) suppository 650 mg, 650 mg, Rectal, Q4H PRN, George Roger MD    sodium chloride (NS) flush 5-40 mL, 5-40 mL, IntraVENous, Q8H, JonathanGeorge MD, 10 mL at 03/19/21 2100    sodium chloride (NS) flush 5-40 mL, 5-40 mL, IntraVENous, PRN, George Roger MD    aspirin chewable tablet 81 mg, 81 mg, Oral, DAILY, George Roger MD, 81 mg at 03/20/21 0841    atorvastatin (LIPITOR) tablet 40 mg, 40 mg, Oral, QHS, George Roger MD, 40 mg at 03/19/21 2046    docusate sodium (COLACE) capsule 100 mg, 100 mg, Oral, BID, George Roger MD, 100 mg at 03/20/21 9990    polyethylene glycol (MIRALAX) packet 17 g, 17 g, Oral, DAILY, George Roger MD, 17 g at 03/20/21 9806    bisacodyL (DULCOLAX) suppository 10 mg, 10 mg, Rectal, DAILY PRN, Flor Ji MD    chlordiazePOXIDE (LIBRIUM) capsule 25 mg, 25 mg, Oral, TID, George Roger MD, 25 mg at 03/20/21 0842    LORazepam (ATIVAN) injection 1 mg, 1 mg, IntraVENous, Q4H PRN, George Cortez MD    metoprolol tartrate (LOPRESSOR) tablet 25 mg, 25 mg, Oral, Q12H, George Cortez MD, 25 mg at 03/20/21 0842    amLODIPine (NORVASC) tablet 5 mg, 5 mg, Oral, DAILY, George Cortez MD, 5 mg at 03/19/21 2046    nicotine (NICODERM CQ) 21 mg/24 hr patch 1 Patch, 1 Patch, TransDERmal, DAILY, George Cortez MD, 1 Patch at 03/20/21 0843    labetaloL (NORMODYNE;TRANDATE) 20 mg/4 mL (5 mg/mL) injection 10 mg, 10 mg, IntraVENous, Q10MIN PRN, Dallas Abraham MD      Labs: I've reviewed the labs for today     This document has been prepared by the Dragon voice recognition system, typographical errors may have occurred.  Attempts have been made to correct errors, however inadvertent errors may persist.

## 2021-03-20 NOTE — PROGRESS NOTES
CM met with patient and wife in reference to discharge planning and choice was given for [facility]. Referral sent via [facility].     CM will follow for acceptance and any additional discharge planning needs

## 2021-03-20 NOTE — PROGRESS NOTES
Hospitalist Progress Note               Daily Progress Note: 120/2021  9year-old noncompliant hypertensive smoker alcohol abuse presenting with TIA symptoms started 3/15/2021, now only with residual left leg weakness which he says was present prior. Surmised malignant hypertension freestanding ED, treated with hydralazine, ultimately here started on amlodipine, metoprolol, blood pressure trend much improved. Neuro consult, MRI, TTE pending as well as carotid studies. Subjective: The patient is seen for follow  up. Blood pressure is much better. He is sitting up in bed, no complaints. Left leg weakness persists, he states that he had a stroke 8 years ago and his leg has been weak ever since. Weakness in his left upper extremity and facial droop which occurred 3 to 4 days ago is resolved. MRI is suggestive of a small to moderate zone of acute infarction of the posterior right basal ganglia and adjacent white matter. TTE with preserved LVEF, normal diastolic function. No atrial septal defect present. Mri brain wo:  IMPRESSION  1. Small to moderate zone of acute infarction of the posterior right basal  ganglia and adjacent white matter. 2.  Background of mild chronic small vessel ischemic changes and mild to  moderate diffuse parenchymal volume loss. 2d echo:  · LV: Calculated LVEF is 65%. Normal cavity size, systolic function (ejection fraction normal) and diastolic function. Mild concentric hypertrophy. Wall motion: normal.  · Saline contrast was given to evaluate for intracardiac shunt. · IAS: No atrial septal defect present. Agitated saline contrast study was performed.     Problem List:  Problem List as of 3/20/2021 Never Reviewed          Codes Class Noted - Resolved    TIA (transient ischemic attack) ICD-10-CM: G45.9  ICD-9-CM: 435.9  3/19/2021 - Present        Essential hypertension ICD-10-CM: I10  ICD-9-CM: 401.9  3/19/2021 - Present        Non-compliance ICD-10-CM: Z91.19  ICD-9-CM: V15.81  3/19/2021 - Present        Malignant hypertension ICD-10-CM: I10  ICD-9-CM: 401.0  3/19/2021 - Present        Smoker ICD-10-CM: F17.200  ICD-9-CM: 305.1  3/19/2021 - Present        Prostate cancer ([facility] (Chronic) ICD-10-CM: C61  ICD-9-CM: 185  3/19/2021 - Present        Alcohol abuse ICD-10-CM: F10.10  ICD-9-CM: 305.00  3/19/2021 - Present        Bipolar depression ([facility] ICD-10-CM: F31.9  ICD-9-CM: 296.50  3/19/2021 - Present        Uncontrolled hypertension ICD-10-CM: I10  ICD-9-CM: 401.9  3/19/2021 - Present              Medications reviewed  Current Facility-Administered Medications   Medication Dose Route Frequency    acetaminophen (TYLENOL) tablet 650 mg  650 mg Oral Q4H PRN    Or    acetaminophen (TYLENOL) solution 650 mg  650 mg Per NG tube Q4H PRN    Or    acetaminophen (TYLENOL) suppository 650 mg  650 mg Rectal Q4H PRN    sodium chloride (NS) flush 5-40 mL  5-40 mL IntraVENous Q8H    sodium chloride (NS) flush 5-40 mL  5-40 mL IntraVENous PRN    aspirin chewable tablet 81 mg  81 mg Oral DAILY    atorvastatin (LIPITOR) tablet 40 mg  40 mg Oral QHS    docusate sodium (COLACE) capsule 100 mg  100 mg Oral BID    polyethylene glycol (MIRALAX) packet 17 g  17 g Oral DAILY    bisacodyL (DULCOLAX) suppository 10 mg  10 mg Rectal DAILY PRN    chlordiazePOXIDE (LIBRIUM) capsule 25 mg  25 mg Oral TID    LORazepam (ATIVAN) injection 1 mg  1 mg IntraVENous Q4H PRN    metoprolol tartrate (LOPRESSOR) tablet 25 mg  25 mg Oral Q12H    amLODIPine (NORVASC) tablet 5 mg  5 mg Oral DAILY    nicotine (NICODERM CQ) 21 mg/24 hr patch 1 Patch  1 Patch TransDERmal DAILY    labetaloL (NORMODYNE;TRANDATE) 20 mg/4 mL (5 mg/mL) injection 10 mg  10 mg IntraVENous Q10MIN PRN       Review of Systems:   Constitutional: Negative for chills, fever and malaise/fatigue. HENT: Negative. Eyes: Negative. Respiratory: Negative. Cardiovascular: Negative. Gastrointestinal: Negative.     Genitourinary: History of robotic prostate surgery secondary to prostate cancer more than 2 years ago when he states that occasionally has incontinence. Musculoskeletal: Negative. Skin: Negative. Neurological: Positive for focal weakness and weakness now only left lower extremity which has been chronic. Negative for dizziness, tingling, tremors, sensory change, speech change, seizures, loss of consciousness and headaches. Endo/Heme/Allergies: Negative. Psychiatric/Behavioral: Negative. Objective:   Physical Exam:     Visit Vitals  BP (!) 159/79 (BP 1 Location: Left upper arm, BP Patient Position: At rest)   Pulse 63   Temp 98 °F (36.7 °C)   Resp 18   Ht 5' 4\" (1.626 m)   Wt 74.8 kg (165 lb)   SpO2 100%   BMI 28.32 kg/m²      O2 Device: Room air    Temp (24hrs), Av.4 °F (36.9 °C), Min:97.8 °F (36.6 °C), Max:99.2 °F (37.3 °C)    No intake/output data recorded.  1901 -  0700  In: -   Out: 450 [Urine:450]    General:  Alert, cooperative, no distress, appears stated age. Head:  ncat   Eyes:  Conjunctivae/corneas clear. PERRL, EOMs intact. Throat: MMM   Neck: Supple, symmetrical, trachea midline, no JVD. Lungs:   Clear to auscultation bilaterally. Chest wall:  No tenderness or deformity. Heart:  Regular rate and rhythm, S1, S2 normal, no murmur, click, rub or gallop. Abdomen:   Soft, non-tender. Bowel sounds normal. No masses,  No organomegaly. Extremities: Extremities normal, atraumatic, no cyanosis or edema. Pulses: 2+ and symmetric all extremities. Skin: Skin color, texture, turgor normal. No rashes or lesions   Neurologic: CNII-XII intact. No focal sensory deficits. Motor exam upper extremities 5 out of 5, lower extremities there is very subtle weakness on the left lower extremity which incidentally characterizes is chronic and back to his baseline. Left lower extremity 5/5. Speech is fluent and cognition is intact.        Data Review:       Recent Days:  Recent Labs 03/20/21  0633 03/19/21  0727   WBC 6.8 5.7   HGB 14.7 14.5   HCT 44.8 43.2    214     Recent Labs     03/20/21  0633 03/19/21  0727    138   K 3.6 4.1    104   CO2 29 27   * 112*   BUN 14 16   CREA 1.03 1.17   CA 8.9 8.8   ALB  --  3.6   TBILI  --  0.3   ALT  --  35     No results for input(s): PH, PCO2, PO2, HCO3, FIO2 in the last 72 hours. 24 Hour Results:  Recent Results (from the past 24 hour(s))   CBC W/O DIFF    Collection Time: 03/20/21  6:33 AM   Result Value Ref Range    WBC 6.8 4.1 - 11.1 K/uL    RBC 4.84 4.10 - 5.70 M/uL    HGB 14.7 12.1 - 17.0 g/dL    HCT 44.8 36.6 - 50.3 %    MCV 92.6 80.0 - 99.0 FL    MCH 30.4 26.0 - 34.0 PG    MCHC 32.8 30.0 - 36.5 g/dL    RDW 15.2 (H) 11.5 - 14.5 %    PLATELET 692 683 - 705 K/uL    MPV 12.4 8.9 - 35.3 FL   METABOLIC PANEL, BASIC    Collection Time: 03/20/21  6:33 AM   Result Value Ref Range    Sodium 138 136 - 145 mmol/L    Potassium 3.6 3.5 - 5.1 mmol/L    Chloride 105 97 - 108 mmol/L    CO2 29 21 - 32 mmol/L    Anion gap 4 (L) 5 - 15 mmol/L    Glucose 105 (H) 65 - 100 mg/dL    BUN 14 6 - 20 mg/dL    Creatinine 1.03 0.70 - 1.30 mg/dL    BUN/Creatinine ratio 14 12 - 20      GFR est AA >60 >60 ml/min/1.73m2    GFR est non-AA >60 >60 ml/min/1.73m2    Calcium 8.9 8.5 - 10.1 mg/dL   TSH 3RD GENERATION    Collection Time: 03/20/21  6:33 AM   Result Value Ref Range    TSH 2.76 0.36 - 3.74 uIU/mL           Assessment/     Patient Active Problem List   Diagnosis Code    TIA (transient ischemic attack) G45.9    Essential hypertension I10    Non-compliance Z91.19    Malignant hypertension I10    Smoker F17.200    Prostate cancer ([facility] C61    Alcohol abuse F10.10    [facility] F31.9    Uncontrolled hypertension I10         -Acute CVA, right basal ganglia. Facial droop and left upper extremity weakness are resolved and his left lower extremity weakness is back to baseline.   CTA of the neck did not show any flow-limiting stenosis. CT of the head did not show any significant stenosis. -Malignant hypertension, bp trend better, contributed to above  -Noncompliant  -alcohol abuse, no signs of wd  -smoker  -bipolar depression  -Hyperlipidemia  Plan:  -Continue statin, continue metoprolol 25 twice daily, amlodipine 10 mg, as needed labetalol.  -pt/ot/slp. Recommendations noted. Home health care.  -continue asa  -Neurology appreciated, follow recommendations. -If stable blood pressure, neurology clearance likely discharge in the morning.  -No signs of withdrawal, continue Librium, as needed Ativan, CIWA protocol.  -Encouraged alcohol and tobacco cessation, is motivated and agreeable to NicoDerm on DC see. Remains a full code. Convert to inpatient status in lieu of acute CVA. Wife is at bedside, surrogate. Disposition, as above, pending course, anticipate home in a.m. if neuro clears and blood pressure stable. Total time spent with patient: 30 minutes. This dictation was done by dragon, computer voice recognition software. Often unanticipated grammatical, syntax, phones and other interpretive errors are inadvertently transcribed. Please excuse errors that have escaped final proofreading.     Marta Lobato MD

## 2021-03-20 NOTE — PROGRESS NOTES
Bedside and Verbal shift change report given to Jeanette Jolly RN (oncoming nurse) by Glenna Price RN (offgoing nurse). Report included the following information SBAR and MAR.

## 2021-03-20 NOTE — CONSULTS
Neurology Consult Note    HPI  Mr. Delfina Chapman is a 63 y.o.  male with a history significant for Stroke, Prostate Cancer, HTN, Bipolar Disorder, EtOH Abuse, Medication Noncompliance who presented with LHB weakness. Last known normal was 4 days prior to presentation. Patient reportedly has slowly been getting better. In the ED, emergent CT head concerning for subacute ischemia in the right external capsule. CTA head/neck was without any findings of a large vessel occlusion. Patient's systolic blood pressures were elevated to 220s in the ED. Patient corroborates the above story. Patient reports that he has had a stroke in the past affecting his left lower extremity but nothing that affected his left upper extremity in the past.  He reports he ambulates without cane or walker at home. He has no personal history of seizures that he is aware of. Stroke Workup     CTH WO  Hypoattenuation in the right putamen/external capsule region consistent with subacute ischemia    CTA Head/Neck  No occlusions, dissections, significant stenosis, pseudoaneurysms or aneurysms in the neurovascular system      IMPRESSION  Mr. Delfina Chapman is a 63 y.o.  male with the above history presented with left hemibody weakness. Patient's last known normal was 4 days prior to presentation and patient's symptoms slowly had been getting better to the point where he was nearly back to his baseline. Emergent CT head concerning for right putaminal/external capsule subacute infarct. CTA head/neck was without any findings for large vessel occlusion. Patient also found have systolic blood pressures up to the 220s. Etiology of patient's infarct most likely small vessel disease likely related to patient's underlying untreated hypertension. Will follow up on patient's MRI of the brain.   Will continue patient on antiplatelet and statin therapy per primary team.  And follow-up on rest of stroke workup for further recommendations as necessary. RECOMMENDATIONS      Right Putaminal/External Capsule Ischemia  -Q4Hr NeuroChecks, TELE  -SBP Goal < 160  -Stroke Prophylaxis: ASA 81, Atorvastatin 40   -PT/OT/ST as needed  -Management of metabolic/infectious derangements to referring teams  -F/U MRI Brain WO, TTE, TSH, HgbA1c, Lipid Panel      Diagnosis and plan was discussed extensively with patient who is in agreement with the above plan. They have been counseled regarding potential side effects of the interventions above and would like to proceed with the aforementioned plan. Review of Systems  A 14 point review was done and pertinent positives & negative findings are listed as part of the history of present illness, all other systems were reviewed and are negative. Physical/Neurological Exam  General:   Janeal Ray -American male  Cardiovascular: tachycardic at times  Pulmonary: no increased work of breathing  Gastrointestinal/Abdomen: soft w/hypoactive bowel sounds   Skin: warm and dry      Patient awake, alert; following central and peripheral commands   No expressive or receptive aphasia;  No dysarthria   Pupils react to light bilaterally; EOM Intact   No visual field deficits on gross exam   Intact to light touch on face bilaterally   Left lower facial droop  No gross hearing loss   Tongue is midline   Motor: 5/5 in the right hemibody, 4/5 in the left hemibody  No abnormal movements   Normal tone throughout   Sensation to light touch intact grossly throughout but decreased in the left hemibody compared to the right  Toes equivocal bilaterally    NIHSS: 6      Past Medical History:   Diagnosis Date    Alcohol abuse 3/19/2021    Bipolar depression ([address] 3/19/2021    Cancer ([address]     Prostate Cancer    Hypertension     Non-compliance 3/19/2021    Prostate cancer ([address] 3/19/2021    Smoker 3/19/2021    Stroke ([facility]       Past Surgical History:   Procedure Laterality Date    HX PROSTATE SURGERY      3 of robotic prostate surgery secondary to cancer     No Known Allergies  Family History   Problem Relation Age of Onset    Hypertension Mother      Relationships   Social connections    Talks on phone: Not on file    Gets together: Not on file    Attends Latter-day service: Not on file    Active member of club or organization: Not on file    Attends meetings of clubs or organizations: Not on file    Relationship status: Not on file         Medications  Current Outpatient Medications   Medication Instructions    metoprolol succinate (TOPROL-XL) 100 mg tablet Oral, DAILY       Current Facility-Administered Medications   Medication Dose Route Frequency    acetaminophen (TYLENOL) tablet 650 mg  650 mg Oral Q4H PRN    Or    acetaminophen (TYLENOL) solution 650 mg  650 mg Per NG tube Q4H PRN    Or    acetaminophen (TYLENOL) suppository 650 mg  650 mg Rectal Q4H PRN    sodium chloride (NS) flush 5-40 mL  5-40 mL IntraVENous Q8H    sodium chloride (NS) flush 5-40 mL  5-40 mL IntraVENous PRN    [START ON 3/20/2021] aspirin chewable tablet 81 mg  81 mg Oral DAILY    atorvastatin (LIPITOR) tablet 40 mg  40 mg Oral QHS    docusate sodium (COLACE) capsule 100 mg  100 mg Oral BID    [START ON 3/20/2021] polyethylene glycol (MIRALAX) packet 17 g  17 g Oral DAILY    bisacodyL (DULCOLAX) suppository 10 mg  10 mg Rectal DAILY PRN    chlordiazePOXIDE (LIBRIUM) capsule 25 mg  25 mg Oral TID    LORazepam (ATIVAN) injection 1 mg  1 mg IntraVENous Q4H PRN    metoprolol tartrate (LOPRESSOR) tablet 25 mg  25 mg Oral Q12H    amLODIPine (NORVASC) tablet 5 mg  5 mg Oral DAILY    [START ON 3/20/2021] nicotine (NICODERM CQ) 21 mg/24 hr patch 1 Patch  1 Patch TransDERmal DAILY    labetaloL (NORMODYNE;TRANDATE) injection 10 mg  10 mg IntraVENous Q10MIN PRN        Objective  Temp:  [98.1 °F (36.7 °C)-100 °F (37.8 °C)]   Pulse (Heart Rate):  [56-70]   BP: (158-240)/()   Resp Rate:  [16-20]   O2 Sat (%):  [98 %-100 %]   Weight:  [74.8 kg (165 lb)] Intake/Output Summary (Last 24 hours) at 3/19/2021 2016  Last data filed at 3/19/2021 7468  Gross per 24 hour   Intake --   Output 450 ml   Net -450 ml     Wt Readings from Last 3 Encounters:   03/19/21 74.8 kg (165 lb)        Labs  Recent Results (from the past 24 hour(s))   GLUCOSE, POC    Collection Time: 03/19/21  7:19 AM   Result Value Ref Range    Glucose (POC) 108 (H) 65 - 100 mg/dL    Performed by Tomer Gonzalez    CBC WITH AUTOMATED DIFF    Collection Time: 03/19/21  7:27 AM   Result Value Ref Range    WBC 5.7 4.1 - 11.1 K/uL    RBC 4.72 4.10 - 5.70 M/uL    HGB 14.5 12.1 - 17.0 g/dL    HCT 43.2 36.6 - 50.3 %    MCV 91.5 80.0 - 99.0 FL    MCH 30.7 26.0 - 34.0 PG    MCHC 33.6 30.0 - 36.5 g/dL    RDW 14.8 (H) 11.5 - 14.5 %    PLATELET 940 675 - 037 K/uL    MPV 11.0 8.9 - 12.9 FL    NEUTROPHILS 52 32 - 75 %    LYMPHOCYTES 32 12 - 49 %    MONOCYTES 11 5 - 13 %    EOSINOPHILS 4 0 - 7 %    BASOPHILS 1 0 - 1 %    IMMATURE GRANULOCYTES 0 0.0 - 0.5 %    ABS. NEUTROPHILS 3.0 1.8 - 8.0 K/UL    ABS. LYMPHOCYTES 1.8 0.8 - 3.5 K/UL    ABS. MONOCYTES 0.6 0.0 - 1.0 K/UL    ABS. EOSINOPHILS 0.2 0.0 - 0.4 K/UL    ABS. BASOPHILS 0.0 0.0 - 0.1 K/UL    ABS. IMM. GRANS. 0.0 0.00 - 0.04 K/UL    DF AUTOMATED     METABOLIC PANEL, COMPREHENSIVE    Collection Time: 03/19/21  7:27 AM   Result Value Ref Range    Sodium 138 136 - 145 mmol/L    Potassium 4.1 3.5 - 5.1 mmol/L    Chloride 104 97 - 108 mmol/L    CO2 27 21 - 32 mmol/L    Anion gap 7 5 - 15 mmol/L    Glucose 112 (H) 65 - 100 mg/dL    BUN 16 6 - 20 mg/dL    Creatinine 1.17 0.70 - 1.30 mg/dL    BUN/Creatinine ratio 14 12 - 20      GFR est AA >60 >60 ml/min/1.73m2    GFR est non-AA >60 >60 ml/min/1.73m2    Calcium 8.8 8.5 - 10.1 mg/dL    Bilirubin, total 0.3 0.2 - 1.0 mg/dL    AST (SGOT) 25 15 - 37 U/L    ALT (SGPT) 35 12 - 78 U/L    Alk.  phosphatase 51 45 - 117 U/L    Protein, total 7.7 6.4 - 8.2 g/dL    Albumin 3.6 3.5 - 5.0 g/dL    Globulin 4.1 (H) 2.0 - 4.0 g/dL    A-G Ratio 0.9 (L) 1.1 - 2.2     TROPONIN I    Collection Time: 03/19/21  7:27 AM   Result Value Ref Range    Troponin-I, Qt. <0.05 <0.05 ng/mL          Significant Diagnostic Studies  All images independently visualized    CTA HEAD NECK W CONT   Final Result   1. Normal head CT examination. No evidence for aneurysms, focal stenoses or   branch occlusions. Neck CTA         Technique: 5 mm noncontrasted images were obtained through the entire neck. Then   0.625 mm axial images with nonionic intravenous contrast were obtained. 2-D and   3-D reformats of head and neck CT angiographic data performed. Comparisons: None       Findings: A standard arch is present. No significant stenosis of the origin of   the great vessels evident. Right carotid artery: No stenoses of the right common carotid artery evident. Minimal atherosclerotic plaque is present at the bifurcation. There is no   stenosis of the origin of the right internal carotid artery by NASCET criteria. Left carotid artery: No stenoses of the left common carotid artery evident. Minimal atherosclerotic plaque is present at the bifurcation. There is no   stenosis of the origin of the left internal carotid artery by NASCET criteria. The vertebral arteries are codominant without focal stenosis. IMPRESSION:    1. No stenosis of the origin of the right internal carotid artery by NASCET   criteria. 2. No stenosis of the origin of the left internal carotid artery by NASCET   criteria. XR CHEST PORT   Final Result      CT HEAD WO CONT   Final Result   Right external capsule lacune as above. No intracranial hemorrhage. Report called to ordering practitioner 7:45 AM 3/19/2021. MRI BRAIN WO CONT    (Results Pending)         This document has been prepared by the Dragon voice recognition system, typographical errors may have occurred.  Attempts have been made to correct errors, however inadvertent errors may persist.

## 2021-03-20 NOTE — PROGRESS NOTES
Bedside and Verbal shift change report given to Radha Del Cid RN (oncoming nurse) by Letty Curtis RN (offgoing nurse). Report included the following information SBAR and MAR.

## 2021-03-20 NOTE — PROGRESS NOTES
Attempted to see pt for evaluation. Pt receiving treatment from another service. Will attempt at a later time/date. Thank you.

## 2021-03-21 VITALS
HEART RATE: 66 BPM | RESPIRATION RATE: 19 BRPM | WEIGHT: 165 LBS | TEMPERATURE: 98.3 F | SYSTOLIC BLOOD PRESSURE: 169 MMHG | BODY MASS INDEX: 28.17 KG/M2 | HEIGHT: 64 IN | DIASTOLIC BLOOD PRESSURE: 77 MMHG | OXYGEN SATURATION: 99 %

## 2021-03-21 PROBLEM — I10 UNCONTROLLED HYPERTENSION: Status: RESOLVED | Noted: 2021-03-19 | Resolved: 2021-03-21

## 2021-03-21 PROBLEM — I63.81 CEREBROVASCULAR ACCIDENT (CVA) OF BASAL GANGLIA (HCC): Status: ACTIVE | Noted: 2021-03-21

## 2021-03-21 PROBLEM — I10 MALIGNANT HYPERTENSION: Status: RESOLVED | Noted: 2021-03-19 | Resolved: 2021-03-21

## 2021-03-21 PROBLEM — G45.9 TIA (TRANSIENT ISCHEMIC ATTACK): Status: RESOLVED | Noted: 2021-03-19 | Resolved: 2021-03-21

## 2021-03-21 PROCEDURE — 99218 HC RM OBSERVATION: CPT

## 2021-03-21 PROCEDURE — 97530 THERAPEUTIC ACTIVITIES: CPT

## 2021-03-21 PROCEDURE — 74011250637 HC RX REV CODE- 250/637: Performed by: INTERNAL MEDICINE

## 2021-03-21 PROCEDURE — 97165 OT EVAL LOW COMPLEX 30 MIN: CPT

## 2021-03-21 RX ORDER — IBUPROFEN 200 MG
1 TABLET ORAL DAILY
Qty: 30 PATCH | Refills: 0 | Status: SHIPPED | OUTPATIENT
Start: 2021-03-22 | End: 2021-04-21

## 2021-03-21 RX ORDER — ATORVASTATIN CALCIUM 40 MG/1
40 TABLET, FILM COATED ORAL
Qty: 30 TAB | Refills: 0 | Status: SHIPPED | OUTPATIENT
Start: 2021-03-21 | End: 2021-04-21 | Stop reason: SDUPTHER

## 2021-03-21 RX ORDER — AMLODIPINE BESYLATE 5 MG/1
10 TABLET ORAL DAILY
Status: DISCONTINUED | OUTPATIENT
Start: 2021-03-22 | End: 2021-03-21 | Stop reason: HOSPADM

## 2021-03-21 RX ORDER — GUAIFENESIN 100 MG/5ML
81 LIQUID (ML) ORAL DAILY
Qty: 30 TAB | Refills: 0 | Status: SHIPPED | OUTPATIENT
Start: 2021-03-22 | End: 2021-04-21 | Stop reason: SDUPTHER

## 2021-03-21 RX ORDER — AMLODIPINE BESYLATE 10 MG/1
10 TABLET ORAL DAILY
Qty: 30 TAB | Refills: 0 | Status: SHIPPED | OUTPATIENT
Start: 2021-03-22 | End: 2021-04-21 | Stop reason: SDUPTHER

## 2021-03-21 RX ORDER — ACETAMINOPHEN 325 MG/1
650 TABLET ORAL
Qty: 30 TAB | Refills: 0 | Status: SHIPPED | OUTPATIENT
Start: 2021-03-21

## 2021-03-21 RX ORDER — METOPROLOL SUCCINATE 25 MG/1
25 TABLET, EXTENDED RELEASE ORAL DAILY
Qty: 30 TAB | Refills: 0 | Status: SHIPPED | OUTPATIENT
Start: 2021-03-21 | End: 2021-04-21 | Stop reason: SDUPTHER

## 2021-03-21 RX ADMIN — POLYETHYLENE GLYCOL 3350 17 G: 17 POWDER, FOR SOLUTION ORAL at 09:12

## 2021-03-21 RX ADMIN — METOPROLOL TARTRATE 25 MG: 25 TABLET, FILM COATED ORAL at 09:13

## 2021-03-21 RX ADMIN — DOCUSATE SODIUM 100 MG: 100 CAPSULE, LIQUID FILLED ORAL at 09:12

## 2021-03-21 RX ADMIN — ASPIRIN 81 MG CHEWABLE TABLET 81 MG: 81 TABLET CHEWABLE at 09:12

## 2021-03-21 RX ADMIN — AMLODIPINE BESYLATE 5 MG: 5 TABLET ORAL at 09:13

## 2021-03-21 RX ADMIN — CHLORDIAZEPOXIDE HYDROCHLORIDE 25 MG: 25 CAPSULE ORAL at 09:13

## 2021-03-21 RX ADMIN — Medication 10 ML: at 05:35

## 2021-03-21 NOTE — DISCHARGE SUMMARY
HOSPITALIST DISCHARGE SUMMARY    NAME: Shiv Corcoran   :  1965   MRN:  [id number]     Date/Time:  3/21/2021 10:58 AM    DISCHARGE DIAGNOSIS:  Principal Problem:    Cerebrovascular accident (CVA) of basal ganglia ([facility] (3/21/2021)    Active Problems:    Essential hypertension (3/19/2021)      Non-compliance (3/19/2021)      Smoker (3/19/2021)      Prostate cancer ([facility] (3/19/2021)      Alcohol abuse (3/19/2021)      Bipolar depression ([facility] (3/19/2021)        Admission Diagnosis:  TIA, Malignant /uncontrolled Hypertension    CONSULTATIONS: Neurology and Hospitalist    Procedures: see electronic medical records for all procedures/Xrays and details which were not copied into this note but were reviewed prior to creation of Plan. Please follow-up tests/labs that are still pendin. None     DISCHARGE SUMMARY/HOSPITAL COURSE: for full details see H&P, daily progress notes, labs, consult notes. Briefly As Per HPI:    50-year-old noncompliant hypertensive smoker alcohol abuse presenting with TIA symptoms started 3/15/2021, now only with residual left leg weakness which he says was present prior. Surmised malignant hypertension freestanding ED, treated with hydralazine, ultimately here started on amlodipine, metoprolol, blood pressure trend much improved. CTA of the head and neck without occlusions dissection significant stenosis pseudoaneurysms or aneurysms in the neurovascular system. CT of the head without contrast showed some hypoattenuation in the right putamen/external capsule region consistent with subacute ischemia. MRI of the brain concluded posterior right BG infarct and chronic microvascular ischemic changes. Echocardiogram revealed a preserved LVEF of 65%, RA/LA normal, no PFO. Neuro consulted. Recommendations were for stroke prophylaxis ASA 81 mg daily and atorvastatin 40 mg daily. Blood pressure control/SBP goal less than 160 mmHg.   Neurology follow-up 2 weeks from discharge. In regards to his EtOH abuse, no signs of withdrawal during this admission. Librium will be tapered off. Strongly recommended cessation. In regards to tobacco abuse, cessation strongly encouraged, NicoDerm offered and accepted. Patient has had compliance issues now for several years and has not seen primary care physician. Wife states is going to take him to [facility] for follow-up and vows to maintain compliance. She appears very supportive of him. New medications and discharge recommendations:  Per med rec, amlodipine 10 mg daily, metoprolol 25 mg twice daily, aspirin 81 mg daily, Lipitor 40 mg daily, NicoDerm 21 mg patch daily and will follow up with PCP for de-escalation to 14 mg and 7 mg per protocol. Need to establish with PCP to follow-up within the next week or 2. Will need to follow-up with neurology and 2 weeks from discharge. Return to the emergency room if symptoms should worsen. Followed by PT/OT with rec's for straight cane + hhc/pt/ot. _______________________________________________________________________   Patient seen and examined by me on day of discharge. Pertinent findings are:  Vitals:  Visit Vitals  BP (!) 169/77   Pulse 66   Temp 98.3 °F (36.8 °C)   Resp 19   Ht 5' 4\" (1.626 m)   Wt 74.8 kg (165 lb)   SpO2 99%   BMI 28.32 kg/m²     Temp (24hrs), Av.2 °F (36.8 °C), Min:98 °F (36.7 °C), Max:98.3 °F (36.8 °C)  Mri brain wo:    IMPRESSION  1.  Small to moderate zone of acute infarction of the posterior right basal  ganglia and adjacent white matter. 2.  Background of mild chronic small vessel ischemic changes and mild to  moderate diffuse parenchymal volume loss.     2d echo:  · LV: Calculated LVEF is 65%. Normal cavity size, systolic function (ejection fraction normal) and diastolic function. Mild concentric hypertrophy. Wall motion: normal.  · Saline contrast was given to evaluate for intracardiac shunt.   IAS: No atrial septal defect present. Agitated saline contrast study was performed. Last 24hr Input/Output:  No intake or output data in the 24 hours ending 03/21/21 1058     Gen:Alert, cooperative, no distress, appears stated age. HEENT:ncat, perrla, eomi, nek supple no jvd or carotid bruit. Chest:clear, non labored lungs/respirations. Cv:s1s2 RRR, no edema  Abd:Soft, non-tender. Bowel sounds normal. No masses,  No organomegaly. Neuro:No visual field deficits on gross exam,Intact to light touch on face bilaterally, mild Left lower facial droop,Motor: 5/5 in the right hemibody, 4/5 in the left hemibody. Sensation to light touch intact grossly throughout but decreased in the left hemibody compared to the right    _______________________________________________________________________  DISPOSITION:    Home with Family:    Home with HH/PT/OT/RN: x   SNF/LTC:    JOSE:    OTHER:    ________________________________________________________________________  Medications Reviewed:  Current Discharge Medication List      START taking these medications    Details   acetaminophen (TYLENOL) 325 mg tablet Take 2 Tabs by mouth every four (4) hours as needed for Fever (For temp greater than or equal to 38.5 C or 101.3 F (Unless hepatic failure or contrindicated). Give first line for fever. ). Qty: 30 Tab, Refills: 0      amLODIPine (NORVASC) 10 mg tablet Take 1 Tab by mouth daily. Qty: 30 Tab, Refills: 0      aspirin 81 mg chewable tablet Take 1 Tab by mouth daily. Qty: 30 Tab, Refills: 0      atorvastatin (LIPITOR) 40 mg tablet Take 1 Tab by mouth nightly. Qty: 30 Tab, Refills: 0      nicotine (NICODERM CQ) 21 mg/24 hr 1 Patch by TransDERmal route daily for 30 days. Follow with PCP to arrange for de-escalation with 14 mg and 7 mg patches accordingly. Qty: 30 Patch, Refills: 0         CONTINUE these medications which have CHANGED    Details   metoprolol succinate (Toprol XL) 25 mg XL tablet Take 1 Tab by mouth daily.   Qty: 30 Tab, Refills: 0           PMH/SH reviewed - no change compared to H&P  ________________________________________________________________________    Recommended diet:  Cardiac Diet  Recommended activity:Activity as tolerated, straight cane recommended. If you have questions regarding the hospital related prescriptions or hospital related issues please call Mary Limon at . Follow Up:  Dr. Dr Terry Wolf  and  Neurology you are to call and set up an appointment to see them in 2 weeks  PCP: establish in 1-2 weeks.    ______________________________________________________________________  Total time spent in discharge (min):40    ________________________________________________________________________    Care Plan discussed with:    Comments   Patient x    Family  x    RN     Care Manager                    Consultant:  x    ________________________________________________________________________  Attending Physician: Sridevi Carrera MD  ________________________________________________________________________  **Lab Data Reviewed:  Recent Results (from the past 24 hour(s))   ECHO ADULT COMPLETE    Collection Time: 03/20/21  1:24 PM   Result Value Ref Range    Pulmonic Regurgitant End Max Velocity 143.00 cm/s    AoV PG 8.00 mmHg    IVSd 1.37 (A) 0.60 - 1.00 cm    LVIDd 3.97 (A) 4.20 - 5.90 cm    LVIDs 2.57 cm    Pulmonic Regurgitant End Max Velocity 101.00 cm/s    LVOT Peak Gradient 4.00 mmHg    LVPWd 1.29 (A) 0.60 - 1.00 cm    LV E' Septal Velocity 6.53 cm/s    LV ED Vol A2C 62.60 cm3    BP EF 72.8 55.0 - 100.0 %    LV ES Vol A2C 17.00 cm3    E/E' septal 11.27     LV Ejection Fraction MOD 2C 35 %    Mitral Valve E Wave Deceleration Time 202.00 ms    MV A Az 74.50 cm/s    MV E Az 73.60 cm/s    MV E/A 0.99     Pulmonic Regurgitant End Max Velocity 125.00 cm/s    Pulmonic Valve Systolic Peak Instantaneous Gradient 6.00 mmHg    P Vein A Dur 95.00 ms    Pulmonary Vein \"A\" Wave Velocity 29.60 cm/s    RVIDd 3.47 cm    Right Atrial Area 4C 15.05 cm2    LV Mass .0 88.0 - 224.0 g    LV Mass AL Index 106.0 49.0 - 115.0 g/m2

## 2021-03-21 NOTE — PROGRESS NOTES
I have reviewed discharge instructions with the patient and spouse. The patient and spouse verbalized understanding. Discharge medications reviewed with patient and spouse and appropriate educational materials and side effects teaching were provided. Nurse informed patient/wife that 6 medications were sent to pharmacy. Discussed with the patient and all questioned fully answered. He will call me if any problems arise. Discharge plan of care/case management plan validated with provider discharge order. Nurse removed IV and skin enforced with guaze and tape. Nurse removed telemetry box and returned to unit. Nurse removed left arm nicotine patch. Nurse informed the patient/wife that follow up aappointments still need to be scheduled for PCP and Neurologist. VS WNL. No c/o pain, N/V/D or respiratory distress. Patient escorted to front entrance.

## 2021-03-21 NOTE — PROGRESS NOTES
DC order noted. Chart reviewed. Spoke with Attending regarding post hospital care needs. Phoned [facility] to verify [email or web address] [phone]. Spoke with the RN on call who could not confirm same. She will get in touch with Jasmin Knight, Admission coordinator for instructions. CM name and # provided for call back. Pt has been accepted for home care by [facility]. DC order and summary faxed to [facility] via [employer] by CM for DC this date.

## 2021-03-21 NOTE — PROGRESS NOTES
Problem: Self Care Deficits Care Plan (Adult)  Goal: *Acute Goals and Plan of Care (Insert Text)  Description: Pt will be MI  LB dressing EOB level  Pt will be MI  grooming EOB level  Pt will be MI  sit<-> prep for toilet transfer  Pt will be MI  toilet transfer with LRAD  Pt will be MI  toileting/cloth mgmt LRAD  Pt will be MI  grooming standing sink  Pt will be MI bathing sitting/standing sink LRAD  Pt will be MI kirill UE HEP in prep for self care tasks      Outcome: Not Met     OCCUPATIONAL THERAPY EVALUATION  Patient: Denis Krabbe (45 y.o. male)  Date: 3/21/2021  Primary Diagnosis: TIA (transient ischemic attack) [G45.9]  Uncontrolled hypertension [I10]  Non-compliance [Z91.19]  Alcohol abuse [F10.10]        Precautions:       ASSESSMENT  Patient is 64 y/o male came to [facility] with c/o left sided weakness (starting 4 days prior LUE flaccid) and placed under observation 3/19/2021 for CVA (right masal gangliar infarct per MRI), CT showing subacute ischemia in right external capsule. Pt has hx of HTN, noncomp;iance with medication last 2 years, daily alcohol abuse, smoker, prostate cancer s/p robotiv surgery in remission, Pt received semi supine in bed A&Ox4 and agreeable for OT eval/tx. Per pt report, pt lives with spouse and mother-in-law in one story home with 3 steps right rail to enter (awaiting placement of ramp) and is MI for self care (using grab bars in bathroom) and MI/independent for functional transfers/mobility at baseline. Pt stating spouse works for [employer] however will be home with pt 24/7 for a little while.      Pt currently presents with decreased balance (noted grabing walls at times, per PT eval recommend cane), generalized weakness (LUE grossly 4/5, RUE grossly 5/5), decreased activity tolerance, slower finger opposition LUE compared to RUE however able to complete, intact proprioception, light touch sensation impaired (stating LUE lighter compared to RUE), unable to test finger to nose 2/2 painful IV in right elbow and increased need for assist with self care (SBA LB dressing increased time EOB, SBA grooming standing sink, SBA toileting/cloth mgmt simulated) and functional transfers/mobility (MI bed mobility, SBA sit<->stand and toilet transfer with gait belt). Pt educated on home safety and use of shower chair upon return home (pt stating has shower chair in attic if needed) with pt verbalizing understanding. Pt would benefit from skilled OT services while at [facility] in order to increase safety and independence with self care and functional transfers/mobility. Recommend discharge to home with [facility] and family care when medically appropraite. Current Level of Function Impacting Discharge (ADLs/self-care): SBA  Other factors to consider for discharge: time since onset, severity of deficits, good family support at home        PLAN :  Recommendations and Planned Interventions: self care training, functional mobility training, therapeutic exercise, balance training, therapeutic activities, endurance activities, neuromuscular re-education, patient education, and home safety training    Frequency/Duration: Patient will be followed by occupational therapy 5 times a week to address goals. Recommendation for discharge: (in order for the patient to meet his/her long term goals)  HHOT with family care    This discharge recommendation:  Has been made in collaboration with the attending provider and/or case management    IF patient discharges home will need the following DME: cane per PT report       SUBJECTIVE:   Patient stated the PT lady told me I need a cane.     OBJECTIVE DATA SUMMARY:   HISTORY:   Past Medical History:   Diagnosis Date    Alcohol abuse 3/19/2021    Bipolar depression ([facility] 3/19/2021    Cancer ([facility]     Prostate Cancer    Hypertension     Non-compliance 3/19/2021    Prostate cancer ([facility] 3/19/2021    Smoker 3/19/2021    Stroke [facility])      Past Surgical History: Procedure Laterality Date    HX PROSTATE SURGERY      3 of robotic prostate surgery secondary to cancer       Expanded or extensive additional review of patient history:     Home Situation  Home Environment: Private residence  # Steps to Enter: 3  Rails to Enter: Yes  Hand Rails : Right  Wheelchair Ramp: No  One/Two Story Residence: One story  Living Alone: No  Support Systems: Family member(s), Spouse/Significant Other/Partner  Patient Expects to be Discharged to[de-identified] Private residence  Current DME Used/Available at Home: Shower chair  Tub or Shower Type: Tub/Shower combination    PLOF: Pt MI for ADLS/IADLS, independent with mobility prior to admission. Hand dominance: Right    EXAMINATION OF PERFORMANCE DEFICITS:  Cognitive/Behavioral Status:  Neurologic State: Alert  Orientation Level: Oriented X4  Cognition: Appropriate decision making; Appropriate for age attention/concentration; Appropriate safety awareness; Follows commands      Hearing:   Auditory  Auditory Impairment: None    Range of Motion:  AROM: Generally decreased, functional     Strength:  Strength: Generally decreased, functional(LUE 4/5, RUE 5/5)     Coordination:   Fine Motor Skills-Upper: (LUE slower compared to RUE finger opposition)       Tone & Sensation:    Sensation: Impaired(LUE lighter light touch sensation compared to RUE)     Balance:  Sitting: Intact  Standing: Impaired  Standing - Static: Good  Standing - Dynamic : Fair    Functional Mobility and Transfers for ADLs:  Bed Mobility:  Rolling: Modified independent  Supine to Sit: Modified independent  Scooting: Modified independent    Transfers:  Sit to Stand: Stand-by assistance  Stand to Sit: Stand-by assistance  Bed to Chair: Stand-by assistance  Bathroom Mobility: Stand-by assistance  Toilet Transfer : Stand-by assistance  Assistive Device : Gait Belt    ADL Assessment:     Oral Facial Hygiene/Grooming: Stand-by assistance    Lower Body Dressing: Stand-by assistance(increased time)    Toileting: Stand by assistance        ADL Intervention and task modifications:  Feeding  Feeding Assistance: Independent  Container Management: Independent  Cutting Food: Independent  Utensil Management: Independent  Food to Mouth: Independent  Drink to Mouth: Independent    Grooming  Grooming Assistance: Stand-by assistance  Position Performed: Standing  Washing Face: Stand-by assistance  Washing Hands: Stand-by assistance  Brushing Teeth: Stand-by assistance     Lower Body Dressing Assistance  Socks: Stand-by assistance  Leg Crossed Method Used: Yes  Position Performed: Seated edge of bed    Toileting  Toileting Assistance: Stand-by assistance  Bladder Hygiene: Stand-by assistance  Bowel Hygiene: Stand-by assistance  Clothing Management: Stand-by assistance       [address] [place] [id number] AM-PACTM \"6 Clicks\"                                                       Daily Activity Inpatient Short Form  How much help from another person does the patient currently need. .. Total; A Lot A Little None   1. Putting on and taking off regular lower body clothing? []  1 []  2 [x]  3 []  4   2. Bathing (including washing, rinsing, drying)? []  1 []  2 [x]  3 []  4   3. Toileting, which includes using toilet, bedpan or urinal? [] 1 []  2 [x]  3 []  4   4. Putting on and taking off regular upper body clothing? []  1 []  2 []  3 [x]  4   5. Taking care of personal grooming such as brushing teeth? []  1 []  2 [x]  3 []  4   6. Eating meals? []  1 []  2 []  3 [x]  4   © 2007, Trustees of [address] [place] [id number], under license to [employer]. All rights reserved     Score: 20/24     Interpretation of Tool:  Represents clinically-significant functional categories (i.e. Activities of daily living).   Percentage of Impairment CH    0%   CI    1-19% CJ    20-39% CK    40-59% CL    60-79% CM    80-99% CN     100%   AMPAC  Score 6-24 24 23 20-22 15-19 10-14 7-9 6        Occupational Therapy Evaluation Charge Determination   History Examination Decision-Making   LOW Complexity : Brief history review  MEDIUM Complexity : 3-5 performance deficits relating to physical, cognitive , or psychosocial skils that result in activity limitations and / or participation restrictions MEDIUM Complexity : Patient may present with comorbidities that affect occupational performnce. Miniml to moderate modification of tasks or assistance (eg, physical or verbal ) with assesment(s) is necessary to enable patient to complete evaluation       Based on the above components, the patient evaluation is determined to be of the following complexity level: LOW   Pain Rating:  No pain reported    Activity Tolerance:   Good  Please refer to the flowsheet for vital signs taken during this treatment. After treatment patient left in no apparent distress:    Sitting in chair and Call bell within reach    COMMUNICATION/EDUCATION:   The patients plan of care was discussed with: Registered nurse. Home safety education was provided and the patient/caregiver indicated understanding. and Patient/family have participated as able in goal setting and plan of care. This patients plan of care is appropriate for delegation to [facility].     Thank you for this referral.  Brea Post  Time Calculation: 33 mins

## 2021-04-21 ENCOUNTER — HOSPITAL ENCOUNTER (EMERGENCY)
Age: 56
Discharge: HOME | End: 2021-04-21
Attending: EMERGENCY MEDICINE
Payer: MEDICARE

## 2021-04-21 VITALS
BODY MASS INDEX: 28.17 KG/M2 | WEIGHT: 165 LBS | SYSTOLIC BLOOD PRESSURE: 138 MMHG | TEMPERATURE: 98.4 F | DIASTOLIC BLOOD PRESSURE: 80 MMHG | HEART RATE: 84 BPM | HEIGHT: 64 IN | OXYGEN SATURATION: 99 % | RESPIRATION RATE: 18 BRPM

## 2021-04-21 DIAGNOSIS — J30.89 ENVIRONMENTAL AND SEASONAL ALLERGIES: Primary | ICD-10-CM

## 2021-04-21 DIAGNOSIS — Z76.0 MEDICATION REFILL: ICD-10-CM

## 2021-04-21 PROCEDURE — 99282 EMERGENCY DEPT VISIT SF MDM: CPT

## 2021-04-21 RX ORDER — AMLODIPINE BESYLATE 10 MG/1
10 TABLET ORAL DAILY
Qty: 30 TAB | Refills: 0 | Status: SHIPPED | OUTPATIENT
Start: 2021-04-21

## 2021-04-21 RX ORDER — FLUTICASONE PROPIONATE 50 MCG
2 SPRAY, SUSPENSION (ML) NASAL DAILY
Qty: 1 BOTTLE | Refills: 0 | Status: SHIPPED | OUTPATIENT
Start: 2021-04-21

## 2021-04-21 RX ORDER — METOPROLOL SUCCINATE 25 MG/1
25 TABLET, EXTENDED RELEASE ORAL DAILY
Qty: 30 TAB | Refills: 0 | Status: SHIPPED | OUTPATIENT
Start: 2021-04-21

## 2021-04-21 RX ORDER — ATORVASTATIN CALCIUM 40 MG/1
40 TABLET, FILM COATED ORAL
Qty: 30 TAB | Refills: 0 | Status: SHIPPED | OUTPATIENT
Start: 2021-04-21

## 2021-04-21 RX ORDER — GUAIFENESIN 100 MG/5ML
81 LIQUID (ML) ORAL DAILY
Qty: 30 TAB | Refills: 0 | Status: SHIPPED | OUTPATIENT
Start: 2021-04-21

## 2021-04-21 NOTE — DISCHARGE INSTRUCTIONS
Take medicines as prescribed and follow-up with your PCP in 3 to 5 days. Keep your current appointments. Return to emergency room for any new or worsening symptoms. Thank you! Thank you for allowing me to care for you in the emergency department. I sincerely hope that you are satisfied with your visit today. It is my goal to provide you with excellent care. Below you will find a list of your labs and imaging from your visit today. Should you have any questions regarding these results please do not hesitate to call the emergency department. Labs -     No results found for this or any previous visit (from the past 12 hour(s)). Radiologic Studies -   No orders to display     CT Results  (Last 48 hours)      None          CXR Results  (Last 48 hours)      None               If you feel that you have not received excellent quality care or timely care, please ask to speak to the nurse manager. Please choose us in the future for your continued health care needs. ------------------------------------------------------------------------------------------------------------  The exam and treatment you received in the Emergency Department were for an urgent problem and are not intended as complete care. It is important that you follow-up with a doctor, nurse practitioner, or physician assistant to:  (1) confirm your diagnosis,  (2) re-evaluation of changes in your illness and treatment, and  (3) for ongoing care. If your symptoms become worse or you do not improve as expected and you are unable to reach your usual health care provider, you should return to the Emergency Department. We are available 24 hours a day. Please take your discharge instructions with you when you go to your follow-up appointment. If you have any problem arranging a follow-up appointment, contact the Emergency Department immediately.     If a prescription has been provided, please have it filled as soon as possible to prevent a delay in treatment. Read the entire medication instruction sheet provided to you by the pharmacy. If you have any questions or reservations about taking the medication due to side effects or interactions with other medications, please call your primary care physician or contact the ER to speak with the charge nurse. Make an appointment with your family doctor or the physician you were referred to for follow-up of this visit as instructed on your discharge paperwork, as this is a mandatory follow-up. Return to the ER if you are unable to be seen or if you are unable to be seen in a timely manner. If you have any problem arranging the follow-up visit, contact the Emergency Department immediately.

## 2021-04-21 NOTE — ED TRIAGE NOTES
\" I was discharged from the hospital a while back and they gave me 30 days supply of my medicine and I dont have a primary appointment until July 2021, and  I took my last pills this morning.  \"

## 2021-04-21 NOTE — ED PROVIDER NOTES
EMERGENCY DEPARTMENT HISTORY AND PHYSICAL EXAM      Date: 2021  Patient Name: Marilyn Rosa    History of Presenting Illness     Chief Complaint   Patient presents with    Medication Refill       History Provided By: Patient    HPI: Marilyn Rosa, 63 y.o. male with a past medical history significant hypertension, stroke and Prostate cancer, alcohol abuse and bipolar depression presents to the ED with cc of patient here for medication refills. He also complains of seasonal allergies and eye irritation and nasal congestion secondary to pollen. No other constitutional symptoms no other complaints at this time. PCP: None    No current facility-administered medications on file prior to encounter. Current Outpatient Medications on File Prior to Encounter   Medication Sig Dispense Refill    acetaminophen (TYLENOL) 325 mg tablet Take 2 Tabs by mouth every four (4) hours as needed for Fever (For temp greater than or equal to 38.5 C or 101.3 F (Unless hepatic failure or contrindicated). Give first line for fever. ). 30 Tab 0    [] nicotine (NICODERM CQ) 21 mg/24 hr 1 Patch by TransDERmal route daily for 30 days. Follow with PCP to arrange for de-escalation with 14 mg and 7 mg patches accordingly.  30 Patch 0       Past History     Past Medical History:  Past Medical History:   Diagnosis Date    Alcohol abuse 3/19/2021    Bipolar depression ([address] 3/19/2021    Cancer ([address]     Prostate Cancer    Hypertension     Non-compliance 3/19/2021    Prostate cancer ([address] 3/19/2021    Smoker 3/19/2021    St[address] ([facility] [address]        Past Surgical History:  Past Surgical History:   Procedure Laterality Date    HX PROSTATE SURGERY      3 of robotic prostate surgery secondary to cancer       Family History:  Family History   Problem Relation Age of Onset    Hypertension Mother        Social History:  Social History     Tobacco Use    Smoking status: Current Every Day Smoker     Packs/day: 1.00    Smokeless tobacco: Never Used   Substance Use Topics    Alcohol use: Not Currently     Frequency: Never    Drug use: Not Currently       Allergies:  No Known Allergies      Review of Systems     Review of Systems   Constitutional: Negative. Negative for chills, fatigue and fever. HENT: Negative. Negative for congestion, ear discharge, sinus pressure and sore throat. Eyes: Negative. Negative for photophobia. Respiratory: Negative. Negative for cough and shortness of breath. Cardiovascular: Negative. Negative for chest pain and palpitations. Gastrointestinal: Negative. Negative for diarrhea, nausea and vomiting. Endocrine: Negative. Genitourinary: Negative. Negative for dysuria and flank pain. Musculoskeletal: Negative. Skin: Negative. Allergic/Immunologic: Negative. Neurological: Negative. Negative for seizures, syncope and headaches. Hematological: Negative. Psychiatric/Behavioral: Negative. All other systems reviewed and are negative. Physical Exam     Physical Exam  Vitals signs and nursing note reviewed. Constitutional:       General: He is not in acute distress. Appearance: He is well-developed. He is not toxic-appearing or diaphoretic. HENT:      Head: Normocephalic and atraumatic. Nose: Nose normal.      Mouth/Throat:      Mouth: Mucous membranes are moist.      Pharynx: Oropharynx is clear. Eyes:      Extraocular Movements: Extraocular movements intact. Pupils: Pupils are equal, round, and reactive to light. Neck:      Musculoskeletal: Normal range of motion and neck supple. Cardiovascular:      Rate and Rhythm: Normal rate and regular rhythm. Heart sounds: Normal heart sounds. Pulmonary:      Effort: Pulmonary effort is normal. No respiratory distress. Breath sounds: Normal breath sounds. Chest:      Chest wall: No mass or tenderness. Abdominal:      General: Bowel sounds are normal. There is no abdominal bruit.       Palpations: Abdomen is soft. There is no hepatomegaly. Tenderness: There is no abdominal tenderness. There is no rebound. Musculoskeletal: Normal range of motion. Right lower leg: He exhibits no tenderness. No edema. Left lower leg: He exhibits no tenderness. No edema. Skin:     General: Skin is warm and dry. Capillary Refill: Capillary refill takes less than 2 seconds. Neurological:      General: No focal deficit present. Mental Status: He is alert and oriented to person, place, and time. Psychiatric:         Mood and Affect: Mood normal.         Behavior: Behavior normal.         Lab and Diagnostic Study Results     Labs -   No results found for this or any previous visit (from the past 12 hour(s)). Radiologic Studies -     CT Results  (Last 48 hours)    None        CXR Results  (Last 48 hours)    None            Medical Decision Making   - I am the first provider for this patient. - I reviewed the vital signs, available nursing notes, past medical history, past surgical history, family history and social history. - Initial assessment performed. The patients presenting problems have been discussed, and they are in agreement with the care plan formulated and outlined with them. I have encouraged them to ask questions as they arise throughout their visit. Vital Signs-Reviewed the patient's vital signs. No data found. Records Reviewed: Nursing Notes    ED Course/Provider Notes (Medical Decision Making): Uneventful ED course, clinical improvement with therapy, patient will be discharged to followup with PCP as directed    Disposition     Disposition: Condition stable and improved  DC- Adult Discharges: All of the diagnostic tests were reviewed and questions answered. Diagnosis, care plan and treatment options were discussed. The patient understands the instructions and will follow up as directed. The patients results have been reviewed with them.   They have been counseled regarding their diagnosis. The patient verbally convey understanding and agreement of the signs, symptoms, diagnosis, treatment and prognosis and additionally agrees to follow up as recommended with their PCP in 24 - 48 hours. They also agree with the care-plan and convey that all of their questions have been answered. I have also put together some discharge instructions for them that include: 1) educational information regarding their diagnosis, 2) how to care for their diagnosis at home, as well a 3) list of reasons why they would want to return to the ED prior to their follow-up appointment, should their condition change. Discharged    DISCHARGE PLAN:  1. Current Discharge Medication List      CONTINUE these medications which have NOT CHANGED    Details   amLODIPine (NORVASC) 10 mg tablet Take 1 Tab by mouth daily. Qty: 30 Tab, Refills: 0      aspirin 81 mg chewable tablet Take 1 Tab by mouth daily. Qty: 30 Tab, Refills: 0      atorvastatin (LIPITOR) 40 mg tablet Take 1 Tab by mouth nightly. Qty: 30 Tab, Refills: 0      metoprolol succinate (Toprol XL) 25 mg XL tablet Take 1 Tab by mouth daily. Qty: 30 Tab, Refills: 0      acetaminophen (TYLENOL) 325 mg tablet Take 2 Tabs by mouth every four (4) hours as needed for Fever (For temp greater than or equal to 38.5 C or 101.3 F (Unless hepatic failure or contrindicated). Give first line for fever. ). Qty: 30 Tab, Refills: 0      nicotine (NICODERM CQ) 21 mg/24 hr 1 Patch by TransDERmal route daily for 30 days. Follow with PCP to arrange for de-escalation with 14 mg and 7 mg patches accordingly. Qty: 30 Patch, Refills: 0           2. Follow-up Information     Follow up With Specialties Details Why Contact Info    Follow-up with PCP as directed  In 3 days          3. Return to ED if worse   4.    Discharge Medication List as of 4/21/2021  9:35 AM      START taking these medications    Details   fluticasone propionate (FLONASE) 50 mcg/actuation nasal spray 2 Sprays by Both Nostrils route daily. , Normal, Disp-1 Bottle, R-0         CONTINUE these medications which have CHANGED    Details   amLODIPine (NORVASC) 10 mg tablet Take 1 Tab by mouth daily. , Normal, Disp-30 Tab, R-0      aspirin 81 mg chewable tablet Take 1 Tab by mouth daily. , Normal, Disp-30 Tab, R-0      atorvastatin (LIPITOR) 40 mg tablet Take 1 Tab by mouth nightly., Normal, Disp-30 Tab, R-0      metoprolol succinate (Toprol XL) 25 mg XL tablet Take 1 Tab by mouth daily. , Normal, Disp-30 Tab, R-0         CONTINUE these medications which have NOT CHANGED    Details   acetaminophen (TYLENOL) 325 mg tablet Take 2 Tabs by mouth every four (4) hours as needed for Fever (For temp greater than or equal to 38.5 C or 101.3 F (Unless hepatic failure or contrindicated). Give first line for fever. )., Normal, Disp-30 Tab, R-0      nicotine (NICODERM CQ) 21 mg/24 hr 1 Patch by TransDERmal route daily for 30 days. Follow with PCP to arrange for de-escalation with 14 mg and 7 mg patches accordingly., Normal, Disp-30 Patch, R-0               Diagnosis     Clinical Impression:   1. Environmental and seasonal allergies    2. Medication refill        Attestations:    Mari Bowman MD    Please note that this dictation was completed with [employer], the [employer] voice recognition software. Quite often unanticipated grammatical, syntax, homophones, and other interpretive errors are inadvertently transcribed by the computer software. Please disregard these errors. Please excuse any errors that have escaped final proofreading. Thank you.

## 2022-03-18 PROBLEM — F10.10 ALCOHOL ABUSE: Status: ACTIVE | Noted: 2021-03-19

## 2022-03-18 PROBLEM — C61 PROSTATE CANCER (HCC): Status: ACTIVE | Noted: 2021-03-19

## 2022-03-18 PROBLEM — F31.9 BIPOLAR DEPRESSION (HCC): Status: ACTIVE | Noted: 2021-03-19

## 2022-03-19 PROBLEM — I10 ESSENTIAL HYPERTENSION: Status: ACTIVE | Noted: 2021-03-19

## 2022-03-19 PROBLEM — Z91.199 NON-COMPLIANCE: Status: ACTIVE | Noted: 2021-03-19

## 2022-03-19 PROBLEM — F17.200 SMOKER: Status: ACTIVE | Noted: 2021-03-19

## 2022-03-20 PROBLEM — I63.81 CEREBROVASCULAR ACCIDENT (CVA) OF BASAL GANGLIA (HCC): Status: ACTIVE | Noted: 2021-03-21

## 2023-03-22 ENCOUNTER — TRANSCRIBE ORDER (OUTPATIENT)
Dept: SCHEDULING | Age: 58
End: 2023-03-22

## 2023-03-22 DIAGNOSIS — F17.210 NICOTINE DEPENDENCE, CIGARETTES, UNCOMPLICATED: Primary | ICD-10-CM

## 2023-05-14 RX ORDER — ASPIRIN 81 MG/1
81 TABLET, CHEWABLE ORAL DAILY
COMMUNITY
Start: 2021-04-21

## 2023-05-14 RX ORDER — ACETAMINOPHEN 325 MG/1
650 TABLET ORAL EVERY 4 HOURS PRN
COMMUNITY
Start: 2021-03-21

## 2023-05-14 RX ORDER — METOPROLOL SUCCINATE 25 MG/1
25 TABLET, EXTENDED RELEASE ORAL DAILY
COMMUNITY
Start: 2021-04-21

## 2023-05-14 RX ORDER — ATORVASTATIN CALCIUM 40 MG/1
40 TABLET, FILM COATED ORAL
COMMUNITY
Start: 2021-04-21

## 2023-05-14 RX ORDER — AMLODIPINE BESYLATE 10 MG/1
10 TABLET ORAL DAILY
COMMUNITY
Start: 2021-04-21

## 2023-05-14 RX ORDER — FLUTICASONE PROPIONATE 50 MCG
2 SPRAY, SUSPENSION (ML) NASAL DAILY
COMMUNITY
Start: 2021-04-21

## 2024-12-11 ENCOUNTER — TRANSCRIBE ORDERS (OUTPATIENT)
Facility: HOSPITAL | Age: 59
End: 2024-12-11

## 2024-12-11 DIAGNOSIS — F17.210 NICOTINE DEPENDENCE, CIGARETTES, UNCOMPLICATED: Primary | ICD-10-CM
